# Patient Record
Sex: FEMALE | Race: WHITE | NOT HISPANIC OR LATINO | Employment: FULL TIME | URBAN - NONMETROPOLITAN AREA
[De-identification: names, ages, dates, MRNs, and addresses within clinical notes are randomized per-mention and may not be internally consistent; named-entity substitution may affect disease eponyms.]

---

## 2017-05-31 ENCOUNTER — TRANSCRIBE ORDERS (OUTPATIENT)
Dept: ADMINISTRATIVE | Facility: HOSPITAL | Age: 32
End: 2017-05-31

## 2017-05-31 DIAGNOSIS — N63.0 BREAST LUMP: Primary | ICD-10-CM

## 2017-06-05 ENCOUNTER — HOSPITAL ENCOUNTER (OUTPATIENT)
Dept: ULTRASOUND IMAGING | Facility: HOSPITAL | Age: 32
Discharge: HOME | End: 2017-06-05
Attending: OBSTETRICS & GYNECOLOGY | Admitting: OBSTETRICS & GYNECOLOGY

## 2017-06-05 ENCOUNTER — HOSPITAL ENCOUNTER (OUTPATIENT)
Dept: MAMMOGRAPHY | Facility: HOSPITAL | Age: 32
Discharge: HOME | End: 2017-06-05

## 2017-06-05 DIAGNOSIS — N63.0 BREAST LUMP: ICD-10-CM

## 2017-06-05 PROCEDURE — 76642 ULTRASOUND BREAST LIMITED: CPT | Performed by: RADIOLOGY

## 2017-06-05 PROCEDURE — 76642 ULTRASOUND BREAST LIMITED: CPT

## 2017-06-05 PROCEDURE — G0279 TOMOSYNTHESIS, MAMMO: HCPCS

## 2017-06-05 PROCEDURE — 77065 DX MAMMO INCL CAD UNI: CPT | Performed by: RADIOLOGY

## 2017-06-05 PROCEDURE — 77061 BREAST TOMOSYNTHESIS UNI: CPT | Performed by: RADIOLOGY

## 2017-06-05 PROCEDURE — G0206 DX MAMMO INCL CAD UNI: HCPCS

## 2017-10-11 ENCOUNTER — HOSPITAL ENCOUNTER (OUTPATIENT)
Dept: GENERAL RADIOLOGY | Facility: HOSPITAL | Age: 32
Discharge: HOME | End: 2017-10-11

## 2017-10-11 ENCOUNTER — HOSPITAL ENCOUNTER (OUTPATIENT)
Dept: GENERAL RADIOLOGY | Facility: HOSPITAL | Age: 32
Discharge: HOME | End: 2017-10-11
Admitting: NURSE PRACTITIONER

## 2017-10-11 ENCOUNTER — TRANSCRIBE ORDERS (OUTPATIENT)
Dept: ADMINISTRATIVE | Facility: HOSPITAL | Age: 32
End: 2017-10-11

## 2017-10-11 ENCOUNTER — LAB (OUTPATIENT)
Dept: LAB | Facility: HOSPITAL | Age: 32
End: 2017-10-11

## 2017-10-11 DIAGNOSIS — M25.50 ARTHRALGIA, UNSPECIFIED JOINT: ICD-10-CM

## 2017-10-11 DIAGNOSIS — Z79.899 ENCOUNTER FOR LONG-TERM (CURRENT) USE OF HIGH-RISK MEDICATION: ICD-10-CM

## 2017-10-11 DIAGNOSIS — D64.9 ANEMIA, UNSPECIFIED TYPE: ICD-10-CM

## 2017-10-11 DIAGNOSIS — M25.50 ARTHRALGIA, UNSPECIFIED JOINT: Primary | ICD-10-CM

## 2017-10-11 LAB
ALBUMIN SERPL-MCNC: 4.4 G/DL (ref 3.5–5)
ALBUMIN/GLOB SERPL: 1.6 G/DL (ref 1.5–2.5)
ALP SERPL-CCNC: 77 U/L (ref 35–104)
ALT SERPL W P-5'-P-CCNC: 42 U/L (ref 10–36)
ANION GAP SERPL CALCULATED.3IONS-SCNC: 6.2 MMOL/L (ref 3.6–11.2)
AST SERPL-CCNC: 21 U/L (ref 10–30)
BASOPHILS # BLD AUTO: 0.02 10*3/MM3 (ref 0–0.3)
BASOPHILS NFR BLD AUTO: 0.2 % (ref 0–2)
BILIRUB SERPL-MCNC: 0.3 MG/DL (ref 0.2–1.8)
BUN BLD-MCNC: 6 MG/DL (ref 7–21)
BUN/CREAT SERPL: 8.7 (ref 7–25)
CALCIUM SPEC-SCNC: 9.6 MG/DL (ref 7.7–10)
CHLORIDE SERPL-SCNC: 107 MMOL/L (ref 99–112)
CHROMATIN AB SERPL-ACNC: 6 IU/ML (ref 0–14)
CO2 SERPL-SCNC: 23.8 MMOL/L (ref 24.3–31.9)
CREAT BLD-MCNC: 0.69 MG/DL (ref 0.43–1.29)
CRP SERPL-MCNC: 0.78 MG/DL (ref 0–0.99)
DEPRECATED RDW RBC AUTO: 43 FL (ref 37–54)
EOSINOPHIL # BLD AUTO: 0.14 10*3/MM3 (ref 0–0.7)
EOSINOPHIL NFR BLD AUTO: 1.6 % (ref 0–5)
ERYTHROCYTE [DISTWIDTH] IN BLOOD BY AUTOMATED COUNT: 14 % (ref 11.5–14.5)
ERYTHROCYTE [SEDIMENTATION RATE] IN BLOOD: 10 MM/HR (ref 0–20)
FSH SERPL-ACNC: 6.1 MIU/ML
GFR SERPL CREATININE-BSD FRML MDRD: 98 ML/MIN/1.73
GLOBULIN UR ELPH-MCNC: 2.8 GM/DL
GLUCOSE BLD-MCNC: 123 MG/DL (ref 70–110)
HBA1C MFR BLD: 5.9 % (ref 4.5–5.7)
HCT VFR BLD AUTO: 43.7 % (ref 37–47)
HGB BLD-MCNC: 14.3 G/DL (ref 12–16)
IMM GRANULOCYTES # BLD: 0.03 10*3/MM3 (ref 0–0.03)
IMM GRANULOCYTES NFR BLD: 0.4 % (ref 0–0.5)
IRON 24H UR-MRATE: 102 MCG/DL (ref 49–151)
IRON SATN MFR SERPL: 29 % (ref 15–50)
LH SERPL-ACNC: 7.2 MIU/ML
LYMPHOCYTES # BLD AUTO: 2.18 10*3/MM3 (ref 1–3)
LYMPHOCYTES NFR BLD AUTO: 25.7 % (ref 21–51)
MCH RBC QN AUTO: 27.9 PG (ref 27–33)
MCHC RBC AUTO-ENTMCNC: 32.7 G/DL (ref 33–37)
MCV RBC AUTO: 85.2 FL (ref 80–94)
MONOCYTES # BLD AUTO: 0.64 10*3/MM3 (ref 0.1–0.9)
MONOCYTES NFR BLD AUTO: 7.5 % (ref 0–10)
NEUTROPHILS # BLD AUTO: 5.48 10*3/MM3 (ref 1.4–6.5)
NEUTROPHILS NFR BLD AUTO: 64.6 % (ref 30–70)
OSMOLALITY SERPL CALC.SUM OF ELEC: 272.8 MOSM/KG (ref 273–305)
PLATELET # BLD AUTO: 215 10*3/MM3 (ref 130–400)
PMV BLD AUTO: 9.6 FL (ref 6–10)
POTASSIUM BLD-SCNC: 3.9 MMOL/L (ref 3.5–5.3)
PROT SERPL-MCNC: 7.2 G/DL (ref 6–8)
RBC # BLD AUTO: 5.13 10*6/MM3 (ref 4.2–5.4)
SODIUM BLD-SCNC: 137 MMOL/L (ref 135–153)
T4 FREE SERPL-MCNC: 1.04 NG/DL (ref 0.89–1.76)
TESTOST SERPL-MCNC: 37.85 NG/DL
TIBC SERPL-MCNC: 346 MCG/DL (ref 241–421)
TSH SERPL DL<=0.05 MIU/L-ACNC: 2.19 MIU/ML (ref 0.55–4.78)
URATE SERPL-MCNC: 5.5 MG/DL (ref 2.4–5.7)
WBC NRBC COR # BLD: 8.49 10*3/MM3 (ref 4.5–12.5)

## 2017-10-11 PROCEDURE — 36415 COLL VENOUS BLD VENIPUNCTURE: CPT

## 2017-10-11 PROCEDURE — 72074 X-RAY EXAM THORAC SPINE4/>VW: CPT | Performed by: RADIOLOGY

## 2017-10-11 PROCEDURE — 86038 ANTINUCLEAR ANTIBODIES: CPT | Performed by: NURSE PRACTITIONER

## 2017-10-11 PROCEDURE — 83036 HEMOGLOBIN GLYCOSYLATED A1C: CPT | Performed by: NURSE PRACTITIONER

## 2017-10-11 PROCEDURE — 84439 ASSAY OF FREE THYROXINE: CPT | Performed by: NURSE PRACTITIONER

## 2017-10-11 PROCEDURE — 84550 ASSAY OF BLOOD/URIC ACID: CPT | Performed by: NURSE PRACTITIONER

## 2017-10-11 PROCEDURE — 84443 ASSAY THYROID STIM HORMONE: CPT | Performed by: NURSE PRACTITIONER

## 2017-10-11 PROCEDURE — 72074 X-RAY EXAM THORAC SPINE4/>VW: CPT

## 2017-10-11 PROCEDURE — 83540 ASSAY OF IRON: CPT | Performed by: NURSE PRACTITIONER

## 2017-10-11 PROCEDURE — 85025 COMPLETE CBC W/AUTO DIFF WBC: CPT | Performed by: NURSE PRACTITIONER

## 2017-10-11 PROCEDURE — 83550 IRON BINDING TEST: CPT | Performed by: NURSE PRACTITIONER

## 2017-10-11 PROCEDURE — 72050 X-RAY EXAM NECK SPINE 4/5VWS: CPT

## 2017-10-11 PROCEDURE — 83002 ASSAY OF GONADOTROPIN (LH): CPT | Performed by: NURSE PRACTITIONER

## 2017-10-11 PROCEDURE — 72050 X-RAY EXAM NECK SPINE 4/5VWS: CPT | Performed by: RADIOLOGY

## 2017-10-11 PROCEDURE — 80053 COMPREHEN METABOLIC PANEL: CPT | Performed by: NURSE PRACTITIONER

## 2017-10-11 PROCEDURE — 83001 ASSAY OF GONADOTROPIN (FSH): CPT | Performed by: NURSE PRACTITIONER

## 2017-10-11 PROCEDURE — 86431 RHEUMATOID FACTOR QUANT: CPT | Performed by: NURSE PRACTITIONER

## 2017-10-11 PROCEDURE — 72110 X-RAY EXAM L-2 SPINE 4/>VWS: CPT

## 2017-10-11 PROCEDURE — 82306 VITAMIN D 25 HYDROXY: CPT | Performed by: NURSE PRACTITIONER

## 2017-10-11 PROCEDURE — 84403 ASSAY OF TOTAL TESTOSTERONE: CPT | Performed by: NURSE PRACTITIONER

## 2017-10-11 PROCEDURE — 85652 RBC SED RATE AUTOMATED: CPT | Performed by: NURSE PRACTITIONER

## 2017-10-11 PROCEDURE — 72110 X-RAY EXAM L-2 SPINE 4/>VWS: CPT | Performed by: RADIOLOGY

## 2017-10-11 PROCEDURE — 86140 C-REACTIVE PROTEIN: CPT | Performed by: NURSE PRACTITIONER

## 2017-10-12 LAB — ANA SER QL: NEGATIVE

## 2017-10-14 LAB
25(OH)D2 SERPL-MCNC: <1 NG/ML
25(OH)D3 SERPL-MCNC: 15 NG/ML
25(OH)D3 SERPL-MCNC: 15 NG/ML

## 2017-11-01 ENCOUNTER — OFFICE VISIT (OUTPATIENT)
Dept: NEUROSURGERY | Facility: CLINIC | Age: 32
End: 2017-11-01

## 2017-11-01 VITALS — HEART RATE: 98 BPM | HEIGHT: 63 IN | TEMPERATURE: 96.8 F | BODY MASS INDEX: 40.04 KG/M2 | WEIGHT: 226 LBS

## 2017-11-01 DIAGNOSIS — M50.30 DEGENERATION OF CERVICAL INTERVERTEBRAL DISC: Primary | ICD-10-CM

## 2017-11-01 PROCEDURE — 99203 OFFICE O/P NEW LOW 30 MIN: CPT | Performed by: PHYSICIAN ASSISTANT

## 2017-11-01 RX ORDER — POTASSIUM CHLORIDE 1.5 G/1.77G
20 POWDER, FOR SOLUTION ORAL 2 TIMES DAILY
COMMUNITY
End: 2018-01-12

## 2017-11-01 RX ORDER — IBUPROFEN 200 MG
800 TABLET ORAL EVERY 6 HOURS PRN
COMMUNITY
End: 2017-12-05

## 2017-11-01 RX ORDER — DICLOFENAC SODIUM 75 MG/1
75 TABLET, DELAYED RELEASE ORAL 2 TIMES DAILY
Qty: 60 TABLET | Refills: 1 | Status: SHIPPED | OUTPATIENT
Start: 2017-11-01 | End: 2018-01-18 | Stop reason: HOSPADM

## 2017-11-01 NOTE — PROGRESS NOTES
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  Today she presents with complaints of pain extending to the right shoulder and down the back of the arm into the ulnar fingers with associated numbness.  This is predominantly on the right side but she is beginning to experience similar symptoms on the left.  This has slowly progressed over the course of a few months.  It is bothersome with any kind of movement.  She has been taking ibuprofen which dulls the pain. She has tried physical therapy but it makes symptoms worse.  Lifting, bending, and reaching exacerbate her symptoms. Nothing in particular helps.     The following portions of the patient's history were reviewed and updated as appropriate: allergies, current medications, past family history, past medical history, past social history, past surgical history and problem list.    Review of Systems   Constitutional: Negative for activity change, appetite change, chills, diaphoresis, fatigue, fever and unexpected weight change.   HENT: Negative for congestion, dental problem, drooling, ear discharge, ear pain, facial swelling, hearing loss, mouth sores, nosebleeds, postnasal drip, rhinorrhea, sinus pressure, sneezing, sore throat, tinnitus, trouble swallowing and voice change.    Eyes: Negative for photophobia, pain, discharge, redness, itching and visual disturbance.   Respiratory: Negative for apnea, cough, choking, chest tightness, shortness of breath, wheezing and stridor.    Cardiovascular: Negative for chest pain, palpitations and leg swelling.   Gastrointestinal: Negative for abdominal distention, abdominal pain, anal bleeding, blood in stool, constipation, diarrhea, nausea, rectal pain and  "vomiting.   Endocrine: Negative for cold intolerance, heat intolerance, polydipsia, polyphagia and polyuria.   Genitourinary: Negative for decreased urine volume, difficulty urinating, dysuria, enuresis, flank pain, frequency, genital sores, hematuria and urgency.   Musculoskeletal: Negative for arthralgias, back pain, gait problem, joint swelling, myalgias, neck pain and neck stiffness.   Skin: Negative for color change, pallor, rash and wound.   Allergic/Immunologic: Negative for environmental allergies, food allergies and immunocompromised state.   Neurological: Negative for dizziness, tremors, seizures, syncope, facial asymmetry, speech difficulty, weakness, light-headedness, numbness and headaches.   Hematological: Negative for adenopathy. Does not bruise/bleed easily.   Psychiatric/Behavioral: Negative for agitation, behavioral problems, confusion, decreased concentration, dysphoric mood, hallucinations, self-injury, sleep disturbance and suicidal ideas. The patient is not nervous/anxious and is not hyperactive.        Objective   Vital Signs: Pulse 98, temperature 96.8 °F (36 °C), height 63\" (160 cm), weight 226 lb (103 kg).  Physical Exam   Constitutional: She appears well-developed and well-nourished. No distress.   HENT:   Head: Normocephalic and atraumatic.   Eyes: EOM are normal. Pupils are equal, round, and reactive to light.   Cardiovascular: Normal rate, regular rhythm and normal heart sounds.    Pulmonary/Chest: Effort normal and breath sounds normal.   Psychiatric: She has a normal mood and affect. Her behavior is normal. Thought content normal.   Nursing note and vitals reviewed.  Musculoskeletal:  Strength is intact in upper and lower extremities to direct testing.  Muscle tone is normal throughout.  Station and gait are normal.  Neurologic:  Gait: Able to tandem walk without difficulty.  Coordination is intact.  Finger to nose, heel-to-shin, rapid alternating movements.  Deep tendon reflexes: 2+ " and symmetrical.  Sensation is intact to light touch throughout.  Patient is oriented to person, place, and time.  Brody sign negative. No ankle clonus    Assessment/Plan    Diagnosis: Degenerative disc disease with possible cervical radiculopathy   Medical Decision Making: I referred patient for an MRI of the cervical spine without gadolinium. She will follow up in our office and further recommendations will be made at that time.            Charo Hou PA-C  Patient Care Team:  IDALMIS Vazquez as PCP - General (Family Medicine)

## 2017-11-13 ENCOUNTER — HOSPITAL ENCOUNTER (OUTPATIENT)
Dept: MRI IMAGING | Facility: HOSPITAL | Age: 32
Discharge: HOME | End: 2017-11-13
Admitting: PHYSICIAN ASSISTANT

## 2017-11-13 DIAGNOSIS — M50.30 DEGENERATION OF CERVICAL INTERVERTEBRAL DISC: ICD-10-CM

## 2017-11-13 PROCEDURE — 72141 MRI NECK SPINE W/O DYE: CPT | Performed by: RADIOLOGY

## 2017-11-13 PROCEDURE — 72141 MRI NECK SPINE W/O DYE: CPT

## 2017-11-20 ENCOUNTER — OFFICE VISIT (OUTPATIENT)
Dept: NEUROSURGERY | Facility: CLINIC | Age: 32
End: 2017-11-20

## 2017-11-20 VITALS
DIASTOLIC BLOOD PRESSURE: 84 MMHG | BODY MASS INDEX: 40.11 KG/M2 | TEMPERATURE: 98.5 F | HEIGHT: 63 IN | WEIGHT: 226.4 LBS | SYSTOLIC BLOOD PRESSURE: 118 MMHG

## 2017-11-20 DIAGNOSIS — M54.12 CERVICAL RADICULOPATHY: ICD-10-CM

## 2017-11-20 DIAGNOSIS — M50.30 DEGENERATION OF CERVICAL INTERVERTEBRAL DISC: Primary | ICD-10-CM

## 2017-11-20 PROCEDURE — 99213 OFFICE O/P EST LOW 20 MIN: CPT | Performed by: PHYSICIAN ASSISTANT

## 2017-11-20 NOTE — PROGRESS NOTES
Patient: Marcie Russ  : 1985  Chart #: [id number]    Date of Service: 2017    CHIEF COMPLAINT: Neck and left shoulder and arm pain with sensory alteration    History of Present Illness Ms. Russ is seen in follow-up.  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.  Patient did well for a while and then started having pain into the right arm again and occasionally some tingling into the thumb and index finger.  Overall she is managing those symptoms with physical therapy.  Her main complaint  increased neck pain that radiates to the left shoulder.  Occasionally this goes down the back of her arm.  She has noticed some numbness in the ulnar fingers recently. Her pain has been gradually getting worse over the past 6 months.  This is particularly bothersome at work with lifting, bending, and reaching.  She has not found anything that alleviates her pain.    The following portions of the patient's history were reviewed and updated as appropriate: allergies, current medications, past family history, past medical history, past social history, past surgical history and problem list.    Review of Systems   Constitutional: Positive for activity change and fatigue. Negative for appetite change, chills, diaphoresis, fever and unexpected weight change.   HENT: Positive for ear pain and tinnitus. Negative for congestion, dental problem, drooling, ear discharge, facial swelling, hearing loss, mouth sores, nosebleeds, postnasal drip, rhinorrhea, sinus pressure, sneezing, sore throat, trouble swallowing and voice change.    Eyes: Negative for photophobia, pain, discharge, redness, itching and visual disturbance.   Respiratory: Negative for apnea, cough, choking, chest tightness, shortness of breath, wheezing and stridor.    Cardiovascular: Negative for chest pain, palpitations and leg swelling.   Gastrointestinal: Positive for  "constipation. Negative for abdominal distention, abdominal pain, anal bleeding, blood in stool, diarrhea, nausea, rectal pain and vomiting.   Endocrine: Negative for cold intolerance, heat intolerance, polydipsia, polyphagia and polyuria.   Genitourinary: Negative for decreased urine volume, difficulty urinating, dysuria, enuresis, flank pain, frequency, genital sores, hematuria and urgency.   Musculoskeletal: Positive for arthralgias, back pain, myalgias, neck pain and neck stiffness. Negative for gait problem and joint swelling.   Skin: Negative for color change, pallor, rash and wound.   Allergic/Immunologic: Negative for environmental allergies, food allergies and immunocompromised state.   Neurological: Positive for numbness (last 3 digits on left hand) and headaches. Negative for dizziness, tremors, seizures, syncope, facial asymmetry, speech difficulty, weakness and light-headedness.   Hematological: Negative for adenopathy. Does not bruise/bleed easily.   Psychiatric/Behavioral: Negative for agitation, behavioral problems, confusion, decreased concentration, dysphoric mood, hallucinations, self-injury, sleep disturbance and suicidal ideas. The patient is not nervous/anxious and is not hyperactive.    All other systems reviewed and are negative.      Objective   Vital Signs: Blood pressure 118/84, temperature 98.5 °F (36.9 °C), temperature source Temporal Artery , height 63\" (160 cm), weight 226 lb 6.4 oz (103 kg).  Physical Exam  Constitutional: She appears well-developed and well-nourished. No distress.   HENT:   Head: Normocephalic and atraumatic.   Eyes: EOM are normal. Pupils are equal, round, and reactive to light.   Cardiovascular: Normal rate, regular rhythm and normal heart sounds.    Pulmonary/Chest: Effort normal and breath sounds normal.   Psychiatric: She has a normal mood and affect. Her behavior is normal. Thought content normal.   Nursing note and vitals reviewed.  Musculoskeletal:  Strength " is intact in upper and lower extremities to direct testing.  Muscle tone is normal throughout.  Station and gait are normal.  Neurologic:  Gait: Able to tandem walk without difficulty.  Coordination is intact.  Finger to nose, heel-to-shin, rapid alternating movements.  Deep tendon reflexes: 2+ and symmetrical.  Sensation is intact to light touch throughout.  Patient is oriented to person, place, and time.  Brody sign negative. No ankle clonus       Radiographic Imaging: MRI of the cervical spine demonstrates some disc bulge at C4-5, central with a leftward component which may be the source of some of her shoulder complaints. Post-surgical fusion at C5-6 Findings below her fusion are unremarkable.      Assessment/Plan    Diagnosis:    1. Disc bulge C4-5 which might the the source of some of her complaints.   2. History of ACDF C5-6 in 2012    Medical Decision Making: I referred patient for EMG/NCV studies to see if she may have some ulnar neuropathy that could explain some of the symptoms into her hand.  She will follow up with Dr. Wells with these studies and bring her MRI for his review.                Charo Hou PA-C  Patient Care Team:  IDALMIS Vazquez as PCP - General (Family Medicine)

## 2017-12-05 ENCOUNTER — OFFICE VISIT (OUTPATIENT)
Dept: NEUROSURGERY | Facility: CLINIC | Age: 32
End: 2017-12-05

## 2017-12-05 VITALS
BODY MASS INDEX: 40.54 KG/M2 | TEMPERATURE: 98.4 F | WEIGHT: 228.8 LBS | HEIGHT: 63 IN | SYSTOLIC BLOOD PRESSURE: 130 MMHG | DIASTOLIC BLOOD PRESSURE: 80 MMHG

## 2017-12-05 DIAGNOSIS — M50.30 DEGENERATION OF CERVICAL INTERVERTEBRAL DISC: Primary | ICD-10-CM

## 2017-12-05 DIAGNOSIS — M54.12 CERVICAL RADICULOPATHY: ICD-10-CM

## 2017-12-05 PROCEDURE — 99213 OFFICE O/P EST LOW 20 MIN: CPT | Performed by: PHYSICIAN ASSISTANT

## 2017-12-05 NOTE — PROGRESS NOTES
Patient: Marcie Russ  : 1985  Chart #: [id number]    Date of Service: 2017    CHIEF COMPLAINT: Neck and right arm pain     History of Present Illness Ms. Russ is seen in follow up.  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  Patient did well for a while and then started having pain into the right arm again and occasionally some tingling into the thumb, index, and middle finger. She also complains of pain in the neck that radiates to the left shoulder. She has numbness in the ulnar fingers on the left.  Her symptoms seem to vacillate.  The last time I saw her, the left arm was more bothersome, this time it is the right side. She has tried physical therapy with traction. Her pain has been progressing over the course of 6 months to a year. It is particularly bothersome with lifting, bending, and reaching.  She has not found anything that alleviates her pain.    The following portions of the patient's history were reviewed and updated as appropriate: allergies, current medications, past family history, past medical history, past social history, past surgical history and problem list.    Review of Systems   Constitutional: Positive for activity change. Negative for appetite change, chills, diaphoresis, fatigue, fever and unexpected weight change.   HENT: Positive for ear pain and tinnitus. Negative for congestion, dental problem, drooling, ear discharge, facial swelling, hearing loss, mouth sores, nosebleeds, postnasal drip, rhinorrhea, sinus pressure, sneezing, sore throat, trouble swallowing and voice change.    Eyes: Negative for photophobia, pain, discharge, redness, itching and visual disturbance.   Respiratory: Negative for apnea, cough, choking, chest tightness, shortness of breath, wheezing and stridor.    Cardiovascular: Negative for chest pain, palpitations and leg swelling.   Gastrointestinal: Negative for  "abdominal distention, abdominal pain, anal bleeding, blood in stool, constipation, diarrhea, nausea, rectal pain and vomiting.   Endocrine: Negative for cold intolerance, heat intolerance, polydipsia, polyphagia and polyuria.   Genitourinary: Positive for urgency. Negative for decreased urine volume, difficulty urinating, dysuria, enuresis, flank pain, frequency, genital sores and hematuria.   Musculoskeletal: Positive for arthralgias, back pain, myalgias, neck pain and neck stiffness. Negative for gait problem and joint swelling.   Skin: Negative for color change, pallor, rash and wound.   Allergic/Immunologic: Negative for environmental allergies, food allergies and immunocompromised state.   Neurological: Negative for dizziness, tremors, seizures, syncope, facial asymmetry, speech difficulty, weakness, light-headedness, numbness and headaches.   Hematological: Negative for adenopathy. Does not bruise/bleed easily.   Psychiatric/Behavioral: Negative for agitation, behavioral problems, confusion, decreased concentration, dysphoric mood, hallucinations, self-injury, sleep disturbance and suicidal ideas. The patient is not nervous/anxious and is not hyperactive.        Objective   Vital Signs: Blood pressure 130/80, temperature 98.4 °F (36.9 °C), height 160 cm (62.99\"), weight 104 kg (228 lb 12.8 oz).  Physical Exam  Constitutional: She appears well-developed and well-nourished. No distress.   HENT:   Head: Normocephalic and atraumatic.   Eyes: EOM are normal. Pupils are equal, round, and reactive to light.   Cardiovascular: Normal rate, regular rhythm and normal heart sounds.    Pulmonary/Chest: Effort normal and breath sounds normal.   Psychiatric: She has a normal mood and affect. Her behavior is normal. Thought content normal.   Nursing note and vitals reviewed.  Musculoskeletal:  Strength is intact in upper and lower extremities to direct testing.  Muscle tone is normal throughout.  Station and gait are " normal.  Neurologic:  Gait: Able to tandem walk without difficulty.  Coordination is intact.  Finger to nose, heel-to-shin, rapid alternating movements.  Deep tendon reflexes: 2+ and symmetrical.  Sensation is intact to light touch throughout.  Patient is oriented to person, place, and time.  Brody sign negative. No ankle clonus       Assessment/Plan    Medical Decision Making: Electrodiagnostic studies demonstrate mild right C7 radiculopathy.  Normal study on the left arm. Dr. Wells has once again reviewed patient's MRI and is under whelmed with the findings at C6-7. He has recommended patient have cervical myelogram to definitively determine the degree of foraminal narrowing.                  Charo Hou PA-C  Patient Care Team:  IDALMIS Vazquez as PCP - General (Family Medicine)

## 2017-12-12 ENCOUNTER — OFFICE VISIT (OUTPATIENT)
Dept: NEUROSURGERY | Facility: CLINIC | Age: 32
End: 2017-12-12

## 2017-12-12 ENCOUNTER — HOSPITAL ENCOUNTER (OUTPATIENT)
Dept: CT IMAGING | Facility: HOSPITAL | Age: 32
Discharge: HOME | End: 2017-12-12

## 2017-12-12 ENCOUNTER — HOSPITAL ENCOUNTER (OUTPATIENT)
Dept: GENERAL RADIOLOGY | Facility: HOSPITAL | Age: 32
Discharge: HOME | End: 2017-12-12
Admitting: NEUROLOGICAL SURGERY

## 2017-12-12 VITALS
RESPIRATION RATE: 18 BRPM | WEIGHT: 221.6 LBS | OXYGEN SATURATION: 97 % | TEMPERATURE: 97.8 F | SYSTOLIC BLOOD PRESSURE: 128 MMHG | BODY MASS INDEX: 37.83 KG/M2 | HEART RATE: 83 BPM | HEIGHT: 64 IN | DIASTOLIC BLOOD PRESSURE: 83 MMHG

## 2017-12-12 VITALS — TEMPERATURE: 98.3 F | WEIGHT: 228 LBS | BODY MASS INDEX: 38.93 KG/M2 | HEIGHT: 64 IN

## 2017-12-12 DIAGNOSIS — M50.30 DEGENERATION OF CERVICAL INTERVERTEBRAL DISC: ICD-10-CM

## 2017-12-12 DIAGNOSIS — Z98.1 HISTORY OF FUSION OF CERVICAL SPINE: ICD-10-CM

## 2017-12-12 DIAGNOSIS — M54.12 CERVICAL RADICULOPATHY: ICD-10-CM

## 2017-12-12 DIAGNOSIS — M50.30 BULGING OF CERVICAL INTERVERTEBRAL DISC: Primary | ICD-10-CM

## 2017-12-12 DIAGNOSIS — M50.30 DEGENERATIVE DISC DISEASE, CERVICAL: ICD-10-CM

## 2017-12-12 PROCEDURE — 72240 MYELOGRAPHY NECK SPINE: CPT

## 2017-12-12 PROCEDURE — 72125 CT NECK SPINE W/O DYE: CPT

## 2017-12-12 PROCEDURE — 99213 OFFICE O/P EST LOW 20 MIN: CPT | Performed by: NEUROLOGICAL SURGERY

## 2017-12-12 PROCEDURE — 0 IOPAMIDOL 61 % SOLUTION: Performed by: NEUROLOGICAL SURGERY

## 2017-12-12 RX ORDER — ACETAMINOPHEN 500 MG
500 TABLET ORAL EVERY 4 HOURS PRN
Status: CANCELLED | OUTPATIENT
Start: 2017-12-12

## 2017-12-12 RX ORDER — PROMETHAZINE HYDROCHLORIDE 25 MG/ML
25 INJECTION, SOLUTION INTRAMUSCULAR; INTRAVENOUS ONCE AS NEEDED
Status: CANCELLED | OUTPATIENT
Start: 2017-12-12

## 2017-12-12 RX ORDER — LIDOCAINE HYDROCHLORIDE 10 MG/ML
5 INJECTION, SOLUTION INFILTRATION; PERINEURAL ONCE
Status: COMPLETED | OUTPATIENT
Start: 2017-12-12 | End: 2017-12-12

## 2017-12-12 RX ORDER — ONDANSETRON 4 MG/1
4 TABLET, FILM COATED ORAL ONCE AS NEEDED
Status: CANCELLED | OUTPATIENT
Start: 2017-12-12

## 2017-12-12 RX ORDER — PROMETHAZINE HYDROCHLORIDE 25 MG/1
25 TABLET ORAL ONCE AS NEEDED
Status: CANCELLED | OUTPATIENT
Start: 2017-12-12

## 2017-12-12 RX ADMIN — LIDOCAINE HYDROCHLORIDE 5 ML: 10 INJECTION, SOLUTION INFILTRATION; PERINEURAL at 07:10

## 2017-12-12 RX ADMIN — IOPAMIDOL 15 ML: 612 INJECTION, SOLUTION INTRATHECAL at 07:10

## 2017-12-12 NOTE — PROGRESS NOTES
Patient: Marcie Russ  : 1985    Primary Care Provider: IDALMIS Vazquez    Requesting Provider: As above      History    Chief Complaint: Neck and right arm pain.    History of Present Illness: Ms. Russ is seen in follow up.  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  Patient did well for a while and then started having pain into the right arm again and occasionally some tingling into the thumb, index, and middle finger. She also complains of pain in the neck that radiates to the left shoulder. She has numbness in the ulnar fingers on the left.  Her symptoms seem to vacillate.  When seen initially the pain involved the left side but when seen last the pain had extended down the right arm.  It continues down the right arm with symptoms involving the thumb index and middle fingers.  Prior to her last surgery the pain involves the right arm as well.  Electrodiagnostic studies suggested a mild right C7 radiculopathy.    Review of Systems   Constitutional: Positive for activity change. Negative for appetite change, chills, diaphoresis, fatigue, fever and unexpected weight change.   HENT: Negative for congestion, dental problem, drooling, ear discharge, ear pain, facial swelling, hearing loss, mouth sores, nosebleeds, postnasal drip, rhinorrhea, sinus pressure, sneezing, sore throat, tinnitus, trouble swallowing and voice change.    Eyes: Negative for photophobia, pain, discharge, redness, itching and visual disturbance.   Respiratory: Negative for apnea, cough, choking, chest tightness, shortness of breath, wheezing and stridor.    Cardiovascular: Negative for chest pain, palpitations and leg swelling.   Gastrointestinal: Negative for abdominal distention, abdominal pain, anal bleeding, blood in stool, constipation, diarrhea, nausea, rectal pain and vomiting.   Endocrine: Negative for cold intolerance, heat  "intolerance, polydipsia, polyphagia and polyuria.   Genitourinary: Negative for decreased urine volume, difficulty urinating, dysuria, enuresis, flank pain, frequency, genital sores, hematuria and urgency.   Musculoskeletal: Positive for arthralgias, myalgias, neck pain and neck stiffness. Negative for back pain, gait problem and joint swelling.   Skin: Negative for color change, pallor, rash and wound.   Allergic/Immunologic: Negative for environmental allergies, food allergies and immunocompromised state.   Neurological: Positive for weakness, numbness and headaches. Negative for dizziness, tremors, seizures, syncope, facial asymmetry, speech difficulty and light-headedness.   Hematological: Negative for adenopathy. Does not bruise/bleed easily.   Psychiatric/Behavioral: Negative for agitation, behavioral problems, confusion, decreased concentration, dysphoric mood, hallucinations, self-injury, sleep disturbance and suicidal ideas. The patient is not nervous/anxious and is not hyperactive.        The patient's past medical history, past surgical history, family history, and social history have been reviewed at length in the electronic medical record.    Physical Exam:   Temp 98.3 °F (36.8 °C)  Ht 162.6 cm (64\")  Wt 103 kg (228 lb)  BMI 39.14 kg/m2  MUSCULOSKELETAL:  Neck tenderness to palpation is not observed.   ROM in neck is normal.  NEUROLOGICAL:  Strength is intact in the upper and lower extremities to direct testing.  Muscle tone is normal throughout.  Station and gait are normal.  Sensation is intact to light touch testing throughout.  Deep tendon reflexes are 1+ and symmetrical.  Ricky's Sign is negative bilaterally.         Medical Decision Making    Data Review:   CT myelogram from this morning shows some paracentral disc bulging rightward at C4-5.  There is some central modest bulging at C2-3.  I don't see any significant root or canal compromise at C7.    Diagnosis:   The patient has neck and " right arm pain without a clear radiographic correlate.    Treatment Options:   I don't have a surgical option for Ms. Russ currently.  If she is struggling then I would pursue an epidural or series of epidural injections.  In the past year or so physical therapy has not been helpful.  She's going to consider her options.  If she would like to pursue the epidurals then she will contact my office and I will make a referral.  Otherwise she will follow-up in our clinic on an as-needed basis.       Diagnosis Plan   1. Bulging of cervical intervertebral disc     2. Cervical radiculopathy     3. Degenerative disc disease, cervical     4. History of fusion of cervical spine         Scribed for King Wells MD by My Smith CMA on 12/12/2017 at 1:26 PM    I, Dr. Wells, personally performed the services described in the documentation, as scribed in my presence, and it is both accurate and complete.

## 2017-12-12 NOTE — POST-PROCEDURE NOTE
MYELOGRAM PROCEDURE NOTE  Neurosurgery    Patient: Marcie Russ  : 1985      PreOp Diagnosis: Cervical radiculopathy    PostOp Diagnosis: Same    Procedure: Cervical myelogram    Surgeon: Russell    Anesthesia: 1% lidocaine    Technique:   Spinal needle: 22 gauge   Contrast:  Isovue 300 (15ml)   Injection site: R L4    EBL: Trace    Specimens removed: None    Complication: None        King Wells MD  17  7:12 AM

## 2017-12-12 NOTE — NURSING NOTE
Pt returned post procedure, tolerated well, bandaid dressing to lumbar spine CDI, no bleeding, drainage, redness, and/or swelling noted. No acute distress noted, no complaints voiced. CB in reach Will continue to monitor.

## 2017-12-12 NOTE — PLAN OF CARE
Problem: Patient Care Overview (Adult)  Goal: Plan of Care Review  Outcome: Ongoing (interventions implemented as appropriate)  Goal: Adult Individualization and Mutuality  Outcome: Ongoing (interventions implemented as appropriate)  Goal: Discharge Needs Assessment  Outcome: Ongoing (interventions implemented as appropriate)    Problem: Myelogram (Adult)  Goal: Signs and Symptoms of Listed Potential Problems Will be Absent or Manageable (Myelogram)  Outcome: Ongoing (interventions implemented as appropriate)

## 2017-12-12 NOTE — NURSING NOTE
Pt discharged post procedure, tolerated procedure well, bandaid dressing to lumbar spine CDI, no bleeding, swelling, redness, and/or drainage. No acute distress noted. Post procedure instructions given on site care, activity restrictions, and s/sx to notify provider with, pt verbalized understanding. Pt instructed to go home and lie down the remainder of day and rest, and not perform and strenuous activity, pt verbalized understanding

## 2017-12-13 ENCOUNTER — TELEPHONE (OUTPATIENT)
Dept: INTERVENTIONAL RADIOLOGY/VASCULAR | Facility: HOSPITAL | Age: 32
End: 2017-12-13

## 2017-12-13 NOTE — TELEPHONE ENCOUNTER
@[id number])@    Other Comments:

## 2018-01-11 ENCOUNTER — APPOINTMENT (OUTPATIENT)
Dept: PREADMISSION TESTING | Facility: HOSPITAL | Age: 33
End: 2018-01-11

## 2018-01-12 ENCOUNTER — APPOINTMENT (OUTPATIENT)
Dept: PREADMISSION TESTING | Facility: HOSPITAL | Age: 33
End: 2018-01-12

## 2018-01-12 VITALS — WEIGHT: 229.5 LBS | BODY MASS INDEX: 40.66 KG/M2 | HEIGHT: 63 IN

## 2018-01-12 LAB
DEPRECATED RDW RBC AUTO: 41.2 FL (ref 37–54)
ERYTHROCYTE [DISTWIDTH] IN BLOOD BY AUTOMATED COUNT: 13.6 % (ref 11.3–14.5)
HCT VFR BLD AUTO: 42.1 % (ref 34.5–44)
HGB BLD-MCNC: 14.2 G/DL (ref 11.5–15.5)
MCH RBC QN AUTO: 28.7 PG (ref 27–31)
MCHC RBC AUTO-ENTMCNC: 33.7 G/DL (ref 32–36)
MCV RBC AUTO: 85.1 FL (ref 80–99)
PLATELET # BLD AUTO: 207 10*3/MM3 (ref 150–450)
PMV BLD AUTO: 9.9 FL (ref 6–12)
RBC # BLD AUTO: 4.95 10*6/MM3 (ref 3.89–5.14)
WBC NRBC COR # BLD: 10.09 10*3/MM3 (ref 3.5–10.8)

## 2018-01-12 PROCEDURE — 36415 COLL VENOUS BLD VENIPUNCTURE: CPT

## 2018-01-12 PROCEDURE — 85027 COMPLETE CBC AUTOMATED: CPT | Performed by: ANESTHESIOLOGY

## 2018-01-12 RX ORDER — ACETAMINOPHEN 500 MG
500 TABLET ORAL EVERY 6 HOURS PRN
COMMUNITY
End: 2018-08-26

## 2018-01-12 NOTE — DISCHARGE INSTRUCTIONS
The following information and instructions were given:    NPO after MN except sips of water with routine prescribed medication (except blood thinner, diabetes, or weight reducing medication) unless otherwise instructed by your physician.  Do not eat, drink, smoke or chew gum after MN the night before surgery. This also includes no mints.    DO NOT shave for two days before your procedure.  Do not wear makeup.      DO NOT wear fingernail polish (gel/regular) and/or acrylic/artificial nails on the day of surgery.   If a patient had recent manicure and would rather not remove polish or artificial nails, then the minimum requirement is that the polish/artificial nails must be removed from the middle finger on each hand.      If patient was having surgery on an upper extremity, then the patient was instructed that fingernail polish/artificial fingernails must be removed for surgery.  NO EXCEPTIONS.      If patient was having surgery on a lower extremity, then the patient was instructed that toenail polish on both extremities must be removed for surgery.  NO EXCEPTIONS.    Remove all jewelry (advised to go to jeweler if unable to remove).  Jewelry especially rings can no longer be taped for surgery.    Leave anything you consider valuable at home.    Leave your suitcase in the car until after your surgery.    Bring the following with you (if applicable)   -picture ID and insurance cards   -Co-pay/deductible required by insurance   -Medications in the original bottles (not a list) including all over-the-counter  medications if not brought to PAT   -Copy of advance directive, living will or power of  documents if not  brought to PAT   -CPAP or BIPAP mask and tubing (do not bring machine)   -Skin prep instructions sheet   -PAT Pass    Education booklet, brochure, handout or binder given to patient.    Pain Control After Surgery handout given to patient.    Respirex use (handout given to patient) and pneumonia  prevention.    Signs and Symptoms of infection.    DVT Prevention stressing the importance of ambulation.    Patient to apply Chlorhexadine wipes to surgical area (as instructed) the night before procedure and the AM of procedure.    When applicable for ERAS patients (colon, orthropedic), patients were instructed to drink 20 ounces of Gatorade or G2 for diabetics (or until full) the morning of surgery.  The Gatorade or G2 must be consumed at least 3 hours before surgery start time.  No RED Gatorade or G2.  Appropriated ERAS handout given to patient during PAT visit.  cervical book given

## 2018-01-16 ENCOUNTER — ANESTHESIA EVENT (OUTPATIENT)
Dept: PERIOP | Facility: HOSPITAL | Age: 33
End: 2018-01-16

## 2018-01-17 ENCOUNTER — ANESTHESIA (OUTPATIENT)
Dept: PERIOP | Facility: HOSPITAL | Age: 33
End: 2018-01-17

## 2018-01-17 ENCOUNTER — APPOINTMENT (OUTPATIENT)
Dept: GENERAL RADIOLOGY | Facility: HOSPITAL | Age: 33
End: 2018-01-17

## 2018-01-17 ENCOUNTER — HOSPITAL ENCOUNTER (OUTPATIENT)
Facility: HOSPITAL | Age: 33
LOS: 1 days | Discharge: HOME | End: 2018-01-18
Attending: ORTHOPAEDIC SURGERY | Admitting: ORTHOPAEDIC SURGERY

## 2018-01-17 DIAGNOSIS — Z74.09 IMPAIRED FUNCTIONAL MOBILITY, BALANCE, GAIT, AND ENDURANCE: Primary | ICD-10-CM

## 2018-01-17 PROBLEM — Z98.1 S/P CERVICAL SPINAL FUSION: Status: ACTIVE | Noted: 2018-01-17

## 2018-01-17 PROBLEM — M54.2 NECK PAIN: Status: ACTIVE | Noted: 2018-01-17

## 2018-01-17 PROBLEM — R73.03 PREDIABETES: Status: ACTIVE | Noted: 2018-01-17

## 2018-01-17 LAB
B-HCG UR QL: NEGATIVE
GLUCOSE BLDC GLUCOMTR-MCNC: 127 MG/DL (ref 70–130)
GLUCOSE BLDC GLUCOMTR-MCNC: 173 MG/DL (ref 70–130)
INTERNAL NEGATIVE CONTROL: NORMAL
INTERNAL POSITIVE CONTROL: REACTIVE
Lab: NORMAL

## 2018-01-17 PROCEDURE — 25810000003 SODIUM CHLORIDE 0.9 % WITH KCL 20 MEQ 20-0.9 MEQ/L-% SOLUTION: Performed by: ORTHOPAEDIC SURGERY

## 2018-01-17 PROCEDURE — 97161 PT EVAL LOW COMPLEX 20 MIN: CPT

## 2018-01-17 PROCEDURE — 25010000002 DEXAMETHASONE PER 1 MG: Performed by: NURSE ANESTHETIST, CERTIFIED REGISTERED

## 2018-01-17 PROCEDURE — C1713 ANCHOR/SCREW BN/BN,TIS/BN: HCPCS | Performed by: ORTHOPAEDIC SURGERY

## 2018-01-17 PROCEDURE — L0120 CERV FLEX N/ADJ FOAM PRE OTS: HCPCS | Performed by: ORTHOPAEDIC SURGERY

## 2018-01-17 PROCEDURE — 76001 HC FLUORO GREATER THAN 1 HOUR: CPT

## 2018-01-17 PROCEDURE — 25010000002 FENTANYL CITRATE (PF) 100 MCG/2ML SOLUTION: Performed by: NURSE ANESTHETIST, CERTIFIED REGISTERED

## 2018-01-17 PROCEDURE — 25010000002 ONDANSETRON PER 1 MG: Performed by: ORTHOPAEDIC SURGERY

## 2018-01-17 PROCEDURE — 25010000002 HYDROMORPHONE PER 4 MG: Performed by: NURSE ANESTHETIST, CERTIFIED REGISTERED

## 2018-01-17 PROCEDURE — 25010000003 CEFAZOLIN IN DEXTROSE 2-4 GM/100ML-% SOLUTION: Performed by: ORTHOPAEDIC SURGERY

## 2018-01-17 PROCEDURE — 63710000001 INSULIN LISPRO (HUMAN) PER 5 UNITS: Performed by: NURSE PRACTITIONER

## 2018-01-17 PROCEDURE — G0378 HOSPITAL OBSERVATION PER HR: HCPCS

## 2018-01-17 PROCEDURE — 25010000002 NEOSTIGMINE 10 MG/10ML SOLUTION: Performed by: NURSE ANESTHETIST, CERTIFIED REGISTERED

## 2018-01-17 PROCEDURE — 25010000002 ONDANSETRON PER 1 MG: Performed by: NURSE ANESTHETIST, CERTIFIED REGISTERED

## 2018-01-17 PROCEDURE — 76000 FLUOROSCOPY <1 HR PHYS/QHP: CPT

## 2018-01-17 PROCEDURE — 25010000002 PROMETHAZINE PER 50 MG: Performed by: NURSE ANESTHETIST, CERTIFIED REGISTERED

## 2018-01-17 PROCEDURE — 82962 GLUCOSE BLOOD TEST: CPT

## 2018-01-17 PROCEDURE — 63710000001 DEXAMETHASONE PER 0.25 MG: Performed by: ORTHOPAEDIC SURGERY

## 2018-01-17 PROCEDURE — 25010000002 PROPOFOL 10 MG/ML EMULSION: Performed by: NURSE ANESTHETIST, CERTIFIED REGISTERED

## 2018-01-17 DEVICE — SCRW SKYLINE VAR SD 15MM: Type: IMPLANTABLE DEVICE | Site: SPINE CERVICAL | Status: FUNCTIONAL

## 2018-01-17 DEVICE — BENGAL SYSTEM STANDARD IMPLANT 6MM, 7 DEGREES
Type: IMPLANTABLE DEVICE | Site: SPINE CERVICAL | Status: FUNCTIONAL
Brand: BENGAL

## 2018-01-17 DEVICE — PLT SKYLINE 1LEVEL 12MM: Type: IMPLANTABLE DEVICE | Site: SPINE CERVICAL | Status: FUNCTIONAL

## 2018-01-17 DEVICE — ALLOGRFT BONE VIVIGEN CELLUAR MATRX FZ 1CC: Type: IMPLANTABLE DEVICE | Site: SPINE CERVICAL | Status: FUNCTIONAL

## 2018-01-17 RX ORDER — LIDOCAINE HYDROCHLORIDE 10 MG/ML
INJECTION, SOLUTION EPIDURAL; INFILTRATION; INTRACAUDAL; PERINEURAL AS NEEDED
Status: DISCONTINUED | OUTPATIENT
Start: 2018-01-17 | End: 2018-01-17 | Stop reason: SURG

## 2018-01-17 RX ORDER — BISACODYL 5 MG/1
5 TABLET, DELAYED RELEASE ORAL DAILY PRN
Status: DISCONTINUED | OUTPATIENT
Start: 2018-01-17 | End: 2018-01-18 | Stop reason: HOSPADM

## 2018-01-17 RX ORDER — IPRATROPIUM BROMIDE AND ALBUTEROL SULFATE 2.5; .5 MG/3ML; MG/3ML
3 SOLUTION RESPIRATORY (INHALATION) ONCE AS NEEDED
Status: DISCONTINUED | OUTPATIENT
Start: 2018-01-17 | End: 2018-01-17 | Stop reason: HOSPADM

## 2018-01-17 RX ORDER — VECURONIUM BROMIDE 1 MG/ML
INJECTION, POWDER, LYOPHILIZED, FOR SOLUTION INTRAVENOUS AS NEEDED
Status: DISCONTINUED | OUTPATIENT
Start: 2018-01-17 | End: 2018-01-17 | Stop reason: SURG

## 2018-01-17 RX ORDER — FAMOTIDINE 20 MG/1
20 TABLET, FILM COATED ORAL ONCE
Status: COMPLETED | OUTPATIENT
Start: 2018-01-17 | End: 2018-01-17

## 2018-01-17 RX ORDER — HYDROMORPHONE HYDROCHLORIDE 1 MG/ML
0.5 INJECTION, SOLUTION INTRAMUSCULAR; INTRAVENOUS; SUBCUTANEOUS
Status: DISCONTINUED | OUTPATIENT
Start: 2018-01-17 | End: 2018-01-17 | Stop reason: HOSPADM

## 2018-01-17 RX ORDER — LIDOCAINE HYDROCHLORIDE 10 MG/ML
0.5 INJECTION, SOLUTION EPIDURAL; INFILTRATION; INTRACAUDAL; PERINEURAL ONCE AS NEEDED
Status: COMPLETED | OUTPATIENT
Start: 2018-01-17 | End: 2018-01-17

## 2018-01-17 RX ORDER — SODIUM CHLORIDE AND POTASSIUM CHLORIDE 150; 900 MG/100ML; MG/100ML
100 INJECTION, SOLUTION INTRAVENOUS CONTINUOUS
Status: DISCONTINUED | OUTPATIENT
Start: 2018-01-17 | End: 2018-01-18 | Stop reason: HOSPADM

## 2018-01-17 RX ORDER — ONDANSETRON 2 MG/ML
4 INJECTION INTRAMUSCULAR; INTRAVENOUS EVERY 6 HOURS PRN
Status: DISCONTINUED | OUTPATIENT
Start: 2018-01-17 | End: 2018-01-18 | Stop reason: HOSPADM

## 2018-01-17 RX ORDER — SODIUM CHLORIDE, SODIUM LACTATE, POTASSIUM CHLORIDE, CALCIUM CHLORIDE 600; 310; 30; 20 MG/100ML; MG/100ML; MG/100ML; MG/100ML
9 INJECTION, SOLUTION INTRAVENOUS CONTINUOUS
Status: DISCONTINUED | OUTPATIENT
Start: 2018-01-17 | End: 2018-01-18 | Stop reason: HOSPADM

## 2018-01-17 RX ORDER — OXYCODONE AND ACETAMINOPHEN 7.5; 325 MG/1; MG/1
1 TABLET ORAL EVERY 4 HOURS PRN
Status: DISCONTINUED | OUTPATIENT
Start: 2018-01-17 | End: 2018-01-18 | Stop reason: HOSPADM

## 2018-01-17 RX ORDER — SODIUM CHLORIDE 0.9 % (FLUSH) 0.9 %
1-10 SYRINGE (ML) INJECTION AS NEEDED
Status: DISCONTINUED | OUTPATIENT
Start: 2018-01-17 | End: 2018-01-17 | Stop reason: HOSPADM

## 2018-01-17 RX ORDER — ONDANSETRON 2 MG/ML
INJECTION INTRAMUSCULAR; INTRAVENOUS AS NEEDED
Status: DISCONTINUED | OUTPATIENT
Start: 2018-01-17 | End: 2018-01-17 | Stop reason: SURG

## 2018-01-17 RX ORDER — GLYCOPYRROLATE 0.2 MG/ML
INJECTION INTRAMUSCULAR; INTRAVENOUS AS NEEDED
Status: DISCONTINUED | OUTPATIENT
Start: 2018-01-17 | End: 2018-01-17 | Stop reason: SURG

## 2018-01-17 RX ORDER — PROPOFOL 10 MG/ML
VIAL (ML) INTRAVENOUS AS NEEDED
Status: DISCONTINUED | OUTPATIENT
Start: 2018-01-17 | End: 2018-01-17 | Stop reason: SURG

## 2018-01-17 RX ORDER — FAMOTIDINE 10 MG/ML
20 INJECTION, SOLUTION INTRAVENOUS EVERY 12 HOURS SCHEDULED
Status: DISCONTINUED | OUTPATIENT
Start: 2018-01-17 | End: 2018-01-18 | Stop reason: HOSPADM

## 2018-01-17 RX ORDER — SODIUM CHLORIDE 0.9 % (FLUSH) 0.9 %
1-10 SYRINGE (ML) INJECTION AS NEEDED
Status: DISCONTINUED | OUTPATIENT
Start: 2018-01-17 | End: 2018-01-18 | Stop reason: HOSPADM

## 2018-01-17 RX ORDER — IBUPROFEN 600 MG/1
600 TABLET ORAL ONCE AS NEEDED
Status: DISCONTINUED | OUTPATIENT
Start: 2018-01-17 | End: 2018-01-17 | Stop reason: HOSPADM

## 2018-01-17 RX ORDER — PROMETHAZINE HYDROCHLORIDE 25 MG/1
25 TABLET ORAL ONCE AS NEEDED
Status: COMPLETED | OUTPATIENT
Start: 2018-01-17 | End: 2018-01-17

## 2018-01-17 RX ORDER — ZOLPIDEM TARTRATE 5 MG/1
5 TABLET ORAL NIGHTLY PRN
Status: DISCONTINUED | OUTPATIENT
Start: 2018-01-17 | End: 2018-01-18 | Stop reason: HOSPADM

## 2018-01-17 RX ORDER — BUPIVACAINE HCL/0.9 % NACL/PF 0.1 %
2 PLASTIC BAG, INJECTION (ML) EPIDURAL ONCE
Status: COMPLETED | OUTPATIENT
Start: 2018-01-17 | End: 2018-01-17

## 2018-01-17 RX ORDER — NICOTINE POLACRILEX 4 MG
15 LOZENGE BUCCAL
Status: DISCONTINUED | OUTPATIENT
Start: 2018-01-17 | End: 2018-01-18 | Stop reason: HOSPADM

## 2018-01-17 RX ORDER — CEFAZOLIN SODIUM 2 G/100ML
2 INJECTION, SOLUTION INTRAVENOUS EVERY 8 HOURS
Status: COMPLETED | OUTPATIENT
Start: 2018-01-17 | End: 2018-01-18

## 2018-01-17 RX ORDER — HYDROMORPHONE HYDROCHLORIDE 1 MG/ML
2 INJECTION, SOLUTION INTRAMUSCULAR; INTRAVENOUS; SUBCUTANEOUS EVERY 4 HOURS PRN
Status: DISCONTINUED | OUTPATIENT
Start: 2018-01-17 | End: 2018-01-18 | Stop reason: HOSPADM

## 2018-01-17 RX ORDER — FENTANYL CITRATE 50 UG/ML
50 INJECTION, SOLUTION INTRAMUSCULAR; INTRAVENOUS
Status: DISCONTINUED | OUTPATIENT
Start: 2018-01-17 | End: 2018-01-17 | Stop reason: HOSPADM

## 2018-01-17 RX ORDER — FAMOTIDINE 20 MG/1
20 TABLET, FILM COATED ORAL EVERY 12 HOURS SCHEDULED
Status: DISCONTINUED | OUTPATIENT
Start: 2018-01-17 | End: 2018-01-18 | Stop reason: HOSPADM

## 2018-01-17 RX ORDER — HYDRALAZINE HYDROCHLORIDE 20 MG/ML
10 INJECTION INTRAMUSCULAR; INTRAVENOUS EVERY 6 HOURS PRN
Status: DISCONTINUED | OUTPATIENT
Start: 2018-01-17 | End: 2018-01-18 | Stop reason: HOSPADM

## 2018-01-17 RX ORDER — OXYCODONE AND ACETAMINOPHEN 10; 325 MG/1; MG/1
1 TABLET ORAL ONCE AS NEEDED
Status: DISCONTINUED | OUTPATIENT
Start: 2018-01-17 | End: 2018-01-17 | Stop reason: HOSPADM

## 2018-01-17 RX ORDER — NALOXONE HCL 0.4 MG/ML
0.4 VIAL (ML) INJECTION
Status: DISCONTINUED | OUTPATIENT
Start: 2018-01-17 | End: 2018-01-18 | Stop reason: HOSPADM

## 2018-01-17 RX ORDER — BISACODYL 10 MG
10 SUPPOSITORY, RECTAL RECTAL DAILY PRN
Status: DISCONTINUED | OUTPATIENT
Start: 2018-01-17 | End: 2018-01-18 | Stop reason: HOSPADM

## 2018-01-17 RX ORDER — FAMOTIDINE 10 MG/ML
20 INJECTION, SOLUTION INTRAVENOUS ONCE
Status: DISCONTINUED | OUTPATIENT
Start: 2018-01-17 | End: 2018-01-17

## 2018-01-17 RX ORDER — ACETAMINOPHEN 325 MG/1
650 TABLET ORAL EVERY 4 HOURS PRN
Status: DISCONTINUED | OUTPATIENT
Start: 2018-01-17 | End: 2018-01-18 | Stop reason: HOSPADM

## 2018-01-17 RX ORDER — ACETAMINOPHEN 325 MG/1
650 TABLET ORAL ONCE AS NEEDED
Status: DISCONTINUED | OUTPATIENT
Start: 2018-01-17 | End: 2018-01-17 | Stop reason: HOSPADM

## 2018-01-17 RX ORDER — NEOSTIGMINE METHYLSULFATE 1 MG/ML
INJECTION, SOLUTION INTRAVENOUS AS NEEDED
Status: DISCONTINUED | OUTPATIENT
Start: 2018-01-17 | End: 2018-01-17 | Stop reason: SURG

## 2018-01-17 RX ORDER — ROCURONIUM BROMIDE 10 MG/ML
INJECTION, SOLUTION INTRAVENOUS AS NEEDED
Status: DISCONTINUED | OUTPATIENT
Start: 2018-01-17 | End: 2018-01-17 | Stop reason: SURG

## 2018-01-17 RX ORDER — DEXAMETHASONE SODIUM PHOSPHATE 4 MG/ML
4 INJECTION, SOLUTION INTRA-ARTICULAR; INTRALESIONAL; INTRAMUSCULAR; INTRAVENOUS; SOFT TISSUE EVERY 6 HOURS SCHEDULED
Status: DISCONTINUED | OUTPATIENT
Start: 2018-01-17 | End: 2018-01-18 | Stop reason: HOSPADM

## 2018-01-17 RX ORDER — PROMETHAZINE HYDROCHLORIDE 25 MG/ML
6.25 INJECTION, SOLUTION INTRAMUSCULAR; INTRAVENOUS ONCE AS NEEDED
Status: COMPLETED | OUTPATIENT
Start: 2018-01-17 | End: 2018-01-17

## 2018-01-17 RX ORDER — DEXAMETHASONE SODIUM PHOSPHATE 4 MG/ML
INJECTION, SOLUTION INTRA-ARTICULAR; INTRALESIONAL; INTRAMUSCULAR; INTRAVENOUS; SOFT TISSUE AS NEEDED
Status: DISCONTINUED | OUTPATIENT
Start: 2018-01-17 | End: 2018-01-17 | Stop reason: SURG

## 2018-01-17 RX ORDER — ACETAMINOPHEN 650 MG/1
650 SUPPOSITORY RECTAL ONCE AS NEEDED
Status: DISCONTINUED | OUTPATIENT
Start: 2018-01-17 | End: 2018-01-17 | Stop reason: HOSPADM

## 2018-01-17 RX ORDER — PROMETHAZINE HYDROCHLORIDE 25 MG/1
25 SUPPOSITORY RECTAL ONCE AS NEEDED
Status: COMPLETED | OUTPATIENT
Start: 2018-01-17 | End: 2018-01-17

## 2018-01-17 RX ORDER — ONDANSETRON 2 MG/ML
4 INJECTION INTRAMUSCULAR; INTRAVENOUS ONCE AS NEEDED
Status: COMPLETED | OUTPATIENT
Start: 2018-01-17 | End: 2018-01-17

## 2018-01-17 RX ORDER — DEXAMETHASONE 4 MG/1
4 TABLET ORAL EVERY 6 HOURS SCHEDULED
Status: DISCONTINUED | OUTPATIENT
Start: 2018-01-17 | End: 2018-01-18 | Stop reason: HOSPADM

## 2018-01-17 RX ORDER — MAGNESIUM HYDROXIDE 1200 MG/15ML
LIQUID ORAL AS NEEDED
Status: DISCONTINUED | OUTPATIENT
Start: 2018-01-17 | End: 2018-01-17 | Stop reason: HOSPADM

## 2018-01-17 RX ORDER — DEXTROSE MONOHYDRATE 25 G/50ML
25 INJECTION, SOLUTION INTRAVENOUS
Status: DISCONTINUED | OUTPATIENT
Start: 2018-01-17 | End: 2018-01-18 | Stop reason: HOSPADM

## 2018-01-17 RX ORDER — FENTANYL CITRATE 50 UG/ML
INJECTION, SOLUTION INTRAMUSCULAR; INTRAVENOUS AS NEEDED
Status: DISCONTINUED | OUTPATIENT
Start: 2018-01-17 | End: 2018-01-17 | Stop reason: SURG

## 2018-01-17 RX ORDER — HYDROMORPHONE HCL 110MG/55ML
PATIENT CONTROLLED ANALGESIA SYRINGE INTRAVENOUS AS NEEDED
Status: DISCONTINUED | OUTPATIENT
Start: 2018-01-17 | End: 2018-01-17 | Stop reason: SURG

## 2018-01-17 RX ADMIN — DEXAMETHASONE SODIUM PHOSPHATE 8 MG: 4 INJECTION, SOLUTION INTRAMUSCULAR; INTRAVENOUS at 08:42

## 2018-01-17 RX ADMIN — METOPROLOL TARTRATE 1 MG: 5 INJECTION, SOLUTION INTRAVENOUS at 09:10

## 2018-01-17 RX ADMIN — PROPOFOL 10 MG: 10 INJECTION, EMULSION INTRAVENOUS at 10:28

## 2018-01-17 RX ADMIN — DEXAMETHASONE 4 MG: 4 TABLET ORAL at 16:22

## 2018-01-17 RX ADMIN — FENTANYL CITRATE 25 MCG: 50 INJECTION, SOLUTION INTRAMUSCULAR; INTRAVENOUS at 10:33

## 2018-01-17 RX ADMIN — HYDROMORPHONE HYDROCHLORIDE 0.5 MG: 2 INJECTION INTRAMUSCULAR; INTRAVENOUS; SUBCUTANEOUS at 09:08

## 2018-01-17 RX ADMIN — OXYCODONE HYDROCHLORIDE AND ACETAMINOPHEN 1 TABLET: 7.5; 325 TABLET ORAL at 13:44

## 2018-01-17 RX ADMIN — LIDOCAINE HYDROCHLORIDE 0.5 ML: 10 INJECTION, SOLUTION EPIDURAL; INFILTRATION; INTRACAUDAL; PERINEURAL at 06:53

## 2018-01-17 RX ADMIN — FAMOTIDINE 20 MG: 20 TABLET, FILM COATED ORAL at 06:55

## 2018-01-17 RX ADMIN — LIDOCAINE HYDROCHLORIDE 50 MG: 10 INJECTION, SOLUTION EPIDURAL; INFILTRATION; INTRACAUDAL; PERINEURAL at 08:24

## 2018-01-17 RX ADMIN — ONDANSETRON 4 MG: 2 INJECTION INTRAMUSCULAR; INTRAVENOUS at 13:44

## 2018-01-17 RX ADMIN — CEFAZOLIN SODIUM 2 G: 2 INJECTION, SOLUTION INTRAVENOUS at 16:23

## 2018-01-17 RX ADMIN — ROCURONIUM BROMIDE 50 MG: 10 SOLUTION INTRAVENOUS at 08:24

## 2018-01-17 RX ADMIN — FENTANYL CITRATE 25 MCG: 50 INJECTION, SOLUTION INTRAMUSCULAR; INTRAVENOUS at 09:19

## 2018-01-17 RX ADMIN — FENTANYL CITRATE 50 MCG: 50 INJECTION INTRAMUSCULAR; INTRAVENOUS at 11:17

## 2018-01-17 RX ADMIN — HYDROMORPHONE HYDROCHLORIDE 1 MG: 2 INJECTION INTRAMUSCULAR; INTRAVENOUS; SUBCUTANEOUS at 08:24

## 2018-01-17 RX ADMIN — ONDANSETRON 4 MG: 2 INJECTION INTRAMUSCULAR; INTRAVENOUS at 10:58

## 2018-01-17 RX ADMIN — SODIUM CHLORIDE, POTASSIUM CHLORIDE, SODIUM LACTATE AND CALCIUM CHLORIDE: 600; 310; 30; 20 INJECTION, SOLUTION INTRAVENOUS at 09:17

## 2018-01-17 RX ADMIN — VECURONIUM BROMIDE 3 MG: 1 INJECTION, POWDER, LYOPHILIZED, FOR SOLUTION INTRAVENOUS at 09:07

## 2018-01-17 RX ADMIN — ONDANSETRON 4 MG: 2 INJECTION INTRAMUSCULAR; INTRAVENOUS at 10:20

## 2018-01-17 RX ADMIN — FENTANYL CITRATE 25 MCG: 50 INJECTION, SOLUTION INTRAMUSCULAR; INTRAVENOUS at 09:30

## 2018-01-17 RX ADMIN — FENTANYL CITRATE 25 MCG: 50 INJECTION, SOLUTION INTRAMUSCULAR; INTRAVENOUS at 10:39

## 2018-01-17 RX ADMIN — PROPOFOL 200 MG: 10 INJECTION, EMULSION INTRAVENOUS at 08:24

## 2018-01-17 RX ADMIN — FAMOTIDINE 20 MG: 20 TABLET, FILM COATED ORAL at 22:04

## 2018-01-17 RX ADMIN — POTASSIUM CHLORIDE AND SODIUM CHLORIDE 100 ML/HR: 900; 150 INJECTION, SOLUTION INTRAVENOUS at 11:29

## 2018-01-17 RX ADMIN — METOPROLOL TARTRATE 1 MG: 5 INJECTION, SOLUTION INTRAVENOUS at 09:12

## 2018-01-17 RX ADMIN — SODIUM CHLORIDE, POTASSIUM CHLORIDE, SODIUM LACTATE AND CALCIUM CHLORIDE 9 ML/HR: 600; 310; 30; 20 INJECTION, SOLUTION INTRAVENOUS at 06:53

## 2018-01-17 RX ADMIN — MUPIROCIN: 20 OINTMENT TOPICAL at 06:55

## 2018-01-17 RX ADMIN — Medication 2 G: at 08:20

## 2018-01-17 RX ADMIN — VECURONIUM BROMIDE 1 MG: 1 INJECTION, POWDER, LYOPHILIZED, FOR SOLUTION INTRAVENOUS at 09:31

## 2018-01-17 RX ADMIN — METOPROLOL TARTRATE 1 MG: 5 INJECTION, SOLUTION INTRAVENOUS at 09:09

## 2018-01-17 RX ADMIN — FENTANYL CITRATE 50 MCG: 50 INJECTION INTRAMUSCULAR; INTRAVENOUS at 11:01

## 2018-01-17 RX ADMIN — METOPROLOL TARTRATE 2 MG: 5 INJECTION, SOLUTION INTRAVENOUS at 09:14

## 2018-01-17 RX ADMIN — PROMETHAZINE HYDROCHLORIDE 6.25 MG: 25 INJECTION INTRAMUSCULAR; INTRAVENOUS at 11:17

## 2018-01-17 RX ADMIN — GLYCOPYRROLATE 0.8 MG: 0.2 INJECTION, SOLUTION INTRAMUSCULAR; INTRAVENOUS at 10:22

## 2018-01-17 RX ADMIN — HYDROMORPHONE HYDROCHLORIDE 0.5 MG: 2 INJECTION INTRAMUSCULAR; INTRAVENOUS; SUBCUTANEOUS at 08:44

## 2018-01-17 RX ADMIN — LIDOCAINE HYDROCHLORIDE 100 MG: 10 INJECTION, SOLUTION EPIDURAL; INFILTRATION; INTRACAUDAL; PERINEURAL at 10:28

## 2018-01-17 RX ADMIN — NEOSTIGMINE METHYLSULFATE 5 MG: 1 INJECTION, SOLUTION INTRAVENOUS at 10:22

## 2018-01-17 RX ADMIN — OXYCODONE HYDROCHLORIDE AND ACETAMINOPHEN 0.5 TABLET: 7.5; 325 TABLET ORAL at 18:36

## 2018-01-17 NOTE — PLAN OF CARE
Problem: Patient Care Overview (Adult)  Goal: Plan of Care Review  Outcome: Ongoing (interventions implemented as appropriate)   01/17/18 1515   Coping/Psychosocial Response Interventions   Plan Of Care Reviewed With patient;spouse   Patient Care Overview   Progress progress toward functional goals as expected   Outcome Evaluation   Outcome Summary/Follow up Plan Patient ambulated 550 feet, no unsteadiness or loss of balance with gait. CGA for all OOB mobility. Plan is d/c home in AM with family. Progress to stair training in AM.        Problem: Inpatient Physical Therapy  Goal: Bed Mobility Goal LTG- PT  Outcome: Outcome(s) achieved Date Met: 01/17/18 01/17/18 1515   Bed Mobility PT LTG   Bed Mobility PT LTG, Date Established 01/17/18   Bed Mobility PT LTG, Time to Achieve 3 days   Bed Mobility PT LTG, Activity Type supine to sit/sit to supine   Bed Mobility PT LTG, [place] Level conditional independence   Bed Mobility PT LTG, Date Goal Reviewed 01/17/18   Bed Mobility PT LTG, Outcome goal met     Goal: Transfer Training Goal 1 LTG- PT  Outcome: Ongoing (interventions implemented as appropriate)   01/17/18 1515   Transfer Training PT LTG   Transfer Training PT LTG, Date Established 01/17/18   Transfer Training PT LTG, Time to Achieve 3 days   Transfer Training PT LTG, Activity Type sit to stand/stand to sit   Transfer Training PT LTG, [place] Level independent   Transfer Training PT LTG, Assist Device other (see comments)  (no AD)   Transfer Training PT LTG, Date Goal Reviewed 01/17/18   Transfer Training PT LTG, Outcome goal ongoing     Goal: Gait Training Goal LTG- PT  Outcome: Ongoing (interventions implemented as appropriate)   01/17/18 1515   Gait Training PT LTG   Gait Training Goal PT LTG, Date Established 01/17/18   Gait Training Goal PT LTG, Time to Achieve 3 days   Gait Training Goal PT LTG, [place] Level independent   Gait Training Goal PT LTG, Assist Device other (see comments)  (no AD)    Gait Training Goal PT LTG, Distance to Achieve 1,000 feet   Gait Training Goal PT LTG, Date Goal Reviewed 01/17/18   Gait Training Goal PT LTG, Outcome goal ongoing     Goal: Stair Training Goal STG- PT  Outcome: Ongoing (interventions implemented as appropriate)   01/17/18 1515   Stair Training PT STG   Stair Training Goal PT STG, Date Established 01/17/18   Stair Training Goal PT STG, Time to Achieve 3 days   Stair Training Goal PT STG, Number of Steps 2   Stair Training Goal PT STG, [place] Level contact guard assist   Stair Training Goal PT STG, Assist Device other (see comments)  (no handrails)   Stair Training Goal PT STG, Date Goal Reviewed 01/17/18   Stair Training Goal PT STG, Outcome goal ongoing     Goal: Stair Training Goal LTG- PT  Outcome: Ongoing (interventions implemented as appropriate)   01/17/18 1515   Stair Training PT LTG   Stair Training Goal PT LTG, Date Established 01/17/18   Stair Training Goal PT LTG, Time to Achieve 3 days   Stair Training Goal PT LTG, Number of Steps 8   Stair Training Goal PT LTG, [place] Level contact guard assist   Stair Training Goal PT LTG, Assist Device 2 handrails   Stair Training Goal PT LTG, Date Goal Reviewed 01/17/18   Stair Training Goal PT LTG, Outcome goal ongoing

## 2018-01-17 NOTE — THERAPY EVALUATION
Acute Care - Physical Therapy Initial Evaluation  [facility]     Patient Name: Marcie Russ  : 1985  MRN: [id number]  Today's Date: 2018   Onset of Illness/Injury or Date of Surgery Date: 18  Date of Referral to PT: 18  Referring Physician: MD Dario      Admit Date: 2018     Visit Dx:    ICD-10-CM ICD-9-CM   1. Impaired functional mobility, balance, gait, and endurance Z74.09 V49.89     Patient Active Problem List   Diagnosis   • Neck pain     Past Medical History:   Diagnosis Date   • Arthritis    • Eczema    • GERD (gastroesophageal reflux disease)    • Headache    • Neck pain    • PCOS (polycystic ovarian syndrome)    • Wears eyeglasses      Past Surgical History:   Procedure Laterality Date   • CERVICAL FUSION  2012    ACDF C5-6 (unknown dr/[facility])   •  SECTION     • DILATATION AND CURETTAGE     • LASER ABLATION CONDYLOMA CERVICAL / VULVAR     • TUBAL ABDOMINAL LIGATION            PT ASSESSMENT (last 72 hours)      PT Evaluation       18 1515 18 1413    Rehab Evaluation    Document Type evaluation  -LR     Subjective Information agree to therapy;complains of;pain   tingling in R hand  -LR     Patient Effort, Rehab Treatment excellent  -LR     Symptoms Noted During/After Treatment other (see comments)   Increased R hand tingling  -LR     Symptoms Noted Comment RN in to assess.   -LR     General Information    Patient Profile Review yes  -LR     Onset of Illness/Injury or Date of Surgery Date 18  -LR     Referring Physician MD Dario  -LR     General Observations Patient supine in bed upon arrival. IV, pulse ox, SCDs.   -LR     Pertinent History Of Current Problem Patient presents for surgical management of persistent and progressive neck pain that radiated down R UE and numbness/tingling in R UE.   -LR     Precautions/Limitations fall precautions;other (see comments);spinal precautions   cervical precautions.   -LR     Prior Level of  Function independent:;all household mobility;community mobility;gait;transfer;bed mobility;shopping;using stairs;driving;min assist:;home management;cooking;cleaning   R  weakness  -LR     Equipment Currently Used at Home grab bar  -LR none  -DK    Plans/Goals Discussed With patient;spouse/S.O.;agreed upon  -LR     Risks Reviewed patient:;spouse/S.O.:;nausea/vomiting;LOB;dizziness;increased discomfort;lines disloged  -LR     Benefits Reviewed patient:;spouse/S.O.:;improve function;increase independence;increase strength;increase balance;decrease pain;increase knowledge  -LR     Barriers to Rehab none identified  -LR     Living Environment    Lives With spouse  -LR spouse  -DK    Living Arrangements house  -LR house  -DK    Home Accessibility bed and bath are not on the first floor;grab bars present (bathtub);house is not wheelchair accessible;stairs to enter home;stairs within home;tub/shower is not walk in  -LR bed and bath on same level;stairs to enter home  -DK    Number of Stairs to Enter Home 8  -LR 8  -DK    Number of Stairs Within Home --   1 step down to kitchen, 2 down into bedroom  -LR     Stair Railings at Home none;outside, present at both sides   concrete columns on either side to use for support.   -LR none  -DK    Type of Financial/Environmental Concern none  -LR --   Pt has [employer] insurance to cover her medication.  -DK    Transportation Available family or friend will provide  -LR car  -DK    Living Environment Comment  available to assist at all times upon d/c home for the first week or so.   -LR --   Pt lives with spouse in 1 level home in [facility].  -DK    Clinical Impression    Date of Referral to PT 01/17/18  -LR     PT Diagnosis herniated disc with radiculopathy C4-5, s/p C5-6 fusion/ s/p ACD and osteophytectomy C4-5 to decompress neural elements, anterior interbody fusion, C4-5 with interbody fusion cage and bone graft, anterior cervical plate C4-5, removal of plate C5-6 and  insepction of fusion  -LR     Prognosis good  -LR     Functional Level At Time Of Evaluation    -LR     Patient/Family Goals Statement go home, decrease pain  -LR     Criteria for Skilled Therapeutic Interventions Met yes;treatment indicated  -LR     Rehab Potential good, to achieve stated therapy goals  -LR     Pain Assessment    Pain Assessment 0-10  -LR     Pain Score 7  -LR     Post Pain Score 7  -LR     Pain Type Acute pain  -LR     Pain Location Neck  -LR     Pain Orientation Anterior  -LR     Pain Radiating Towards R UE, to elbow  -LR     Pain Intervention(s) Repositioned;Ambulation/increased activity  -LR     Vision Assessment/Intervention    Visual Impairment WFL  -LR     Cognitive Assessment/Intervention    Current Cognitive/Communication Assessment functional  -LR     Orientation Status oriented x 4;required verbal cueing (specifiy in comments)  -LR     Follows Commands/Answers Questions 100% of the time;able to follow single-step instructions;needs cueing;needs repetition  -LR     Personal Safety WNL/WFL  -LR     ROM (Range of Motion)    General ROM no range of motion deficits identified  -LR     MMT (Manual Muscle Testing)    General MMT Assessment upper extremity strength deficits identified  -LR     Upper Extremity    Upper Ext Manual Muscle Testing Detail R  strength 4/5  -LR     Bed Mobility, Assessment/Treatment    Bed Mobility, Assistive Device head of bed elevated;bed rails  -LR     Bed Mob, Supine to Sit, [place] verbal cues required;conditional independence  -LR     Bed Mob, Sit to Supine, [place] verbal cues required;conditional independence  -LR     Bed Mobility, Impairments pain  -LR     Bed Mobility, Comment Verbal cues for correct log roll technique and to keep hips and shoulders aligned throughout.   -LR     Transfer Assessment/Treatment    Transfers, Sit-Stand [place] verbal cues required;stand by assist  -LR     Transfers, Stand-Sit [place] verbal cues  required;stand by assist  -LR     Transfers, Sit-Stand-Sit, Assist Device other (see comments)   no AD required  -LR     Transfer, Safety Issues step length decreased  -LR     Transfer, Impairments strength decreased;pain  -LR     Transfer, Comment Verbal cues for correct hand placement with t/f. Patient denied dizziness upon standing up.   -LR     Gait Assessment/Treatment    Gait, [place] Level verbal cues required;contact guard assist  -LR     Gait, Assistive Device other (see comments)   no AD required  -LR     Gait, Distance (Feet) 550  -LR     Gait, Gait Pattern Analysis swing-through gait  -LR     Gait, Gait Deviations bilateral:;step length decreased  -LR     Gait, Impairments pain  -LR     Gait, Comment Patient ambulated with step through gait pattern at slow pace with good arm swing. No unsteadiness or LOB with ambulation. Gait limited by fatigue.   -LR     Therapy Exercises    Exercise Protocols --   initiate HEP POD#1, PT reviewed and demonstrated  -LR     Sensory Assessment/Intervention    Sensory Impairment tingling;numbness   R hand and fingers  -LR     Light Touch LUE;RUE  -LR     LUE Light Touch WNL  -LR     RUE Light Touch other (see comments)   absent thumb/middle finger;diminshed index finger  -LR     Positioning and Restraints    Pre-Treatment Position in bed  -LR     Post Treatment Position bed  -LR     In Bed notified nsg;supine;call light within reach;encouraged to call for assist;with family/caregiver;side rails up x2;SCD pump applied  -LR       User Key  (r) = Recorded By, (t) = Taken By, (c) = Cosigned By    Initials Name Provider Type    LR Tana Dunn, IVETT Physical Therapist    NIKOLE Meehan, RN Case Manager          Physical Therapy Education     Title: PT OT SLP Therapies (Done)     Topic: Physical Therapy (Done)     Point: Mobility training (Done)    Learning Progress Summary    Learner Readiness Method Response Comment Documented by Status   Patient Acceptance  E,D VU,NR Educated on cervical precautions, log roll technique, and HEP. LR 01/17/18 1620 Done               Point: Home exercise program (Done)    Learning Progress Summary    Learner Readiness Method Response Comment Documented by Status   Patient Acceptance E,D VU,NR Educated on cervical precautions, log roll technique, and HEP. LR 01/17/18 1620 Done               Point: Body mechanics (Done)    Learning Progress Summary    Learner Readiness Method Response Comment Documented by Status   Patient Acceptance E,D VU,NR Educated on cervical precautions, log roll technique, and HEP. LR 01/17/18 1620 Done               Point: Precautions (Done)    Learning Progress Summary    Learner Readiness Method Response Comment Documented by Status   Patient Acceptance E,D VU,NR Educated on cervical precautions, log roll technique, and HEP. LR 01/17/18 1620 Done                      User Key     Initials Effective Dates Name Provider Type Discipline    LR 06/19/15 -  Tana Dunn, PT Physical Therapist PT                PT Recommendation and Plan  Anticipated Equipment Needs At Discharge:  (none)  Anticipated Discharge Disposition: home with assist  Planned Therapy Interventions: gait training, home exercise program, patient/family education, stair training, strengthening, transfer training, bed mobility training  PT Frequency: daily  Plan of Care Review  Plan Of Care Reviewed With: patient, spouse  Progress: progress toward functional goals as expected  Outcome Summary/Follow up Plan: Patient ambulated 550 feet, no unsteadiness or loss of balance with gait. CGA for all OOB mobility. Plan is d/c home in AM with family. Progress to stair training in AM.           IP PT Goals       01/17/18 1515          Bed Mobility PT LTG    Bed Mobility PT LTG, Date Established 01/17/18  -LR      Bed Mobility PT LTG, Time to Achieve 3 days  -LR      Bed Mobility PT LTG, Activity Type supine to sit/sit to supine  -LR      Bed Mobility PT  LTG, [place] Level conditional independence  -LR      Bed Mobility PT LTG, Date Goal Reviewed 01/17/18  -LR      Bed Mobility PT LTG, Outcome goal met  -LR      Transfer Training PT LTG    Transfer Training PT LTG, Date Established 01/17/18  -LR      Transfer Training PT LTG, Time to Achieve 3 days  -LR      Transfer Training PT LTG, Activity Type sit to stand/stand to sit  -LR      Transfer Training PT LTG, [place] Level independent  -LR      Transfer Training PT LTG, Assist Device other (see comments)   no AD  -LR      Transfer Training PT  LTG, Date Goal Reviewed 01/17/18  -LR      Transfer Training PT LTG, Outcome goal ongoing  -LR      Gait Training PT LTG    Gait Training Goal PT LTG, Date Established 01/17/18  -LR      Gait Training Goal PT LTG, Time to Achieve 3 days  -LR      Gait Training Goal PT LTG, [place] Level independent  -LR      Gait Training Goal PT LTG, Assist Device other (see comments)   no AD  -LR      Gait Training Goal PT LTG, Distance to Achieve 1,000 feet  -LR      Gait Training Goal PT LTG, Date Goal Reviewed 01/17/18  -LR      Gait Training Goal PT LTG, Outcome goal ongoing  -LR      Stair Training PT STG    Stair Training Goal PT STG, Date Established 01/17/18  -LR      Stair Training Goal PT STG, Time to Achieve 3 days  -LR      Stair Training Goal PT STG, Number of Steps 2  -LR      Stair Training Goal PT STG, [place] Level contact guard assist  -LR      Stair Training Goal PT STG, Assist Device other (see comments)   no handrails  -LR      Stair Training Goal PT STG, Date Goal Reviewed 01/17/18  -LR      Stair Training Goal PT STG, Outcome goal ongoing  -LR      Stair Training PT LTG    Stair Training Goal PT LTG, Date Established 01/17/18  -LR      Stair Training Goal PT LTG, Time to Achieve 3 days  -LR      Stair Training Goal PT LTG, Number of Steps 8  -LR      Stair Training Goal PT LTG, [place] Level contact guard assist  -LR      Stair Training Goal PT  LTG, Assist Device 2 handrails  -LR      Stair Training Goal PT LTG, Date Goal Reviewed 01/17/18  -LR      Stair Training Goal PT LTG, Outcome goal ongoing  -LR        User Key  (r) = Recorded By, (t) = Taken By, (c) = Cosigned By    Initials Name Provider Type    LR Tana Dunn, PT Physical Therapist                Outcome Measures       01/17/18 1515          How much help from another person do you currently need...    Turning from your back to your side while in flat bed without using bedrails? 4  -LR      Moving from lying on back to sitting on the side of a flat bed without bedrails? 4  -LR      Moving to and from a bed to a chair (including a wheelchair)? 3  -LR      Standing up from a chair using your arms (e.g., wheelchair, bedside chair)? 3  -LR      Climbing 3-5 steps with a railing? 3  -LR      To walk in hospital room? 3  -LR      AM-PAC 6 Clicks Score 20  -LR      Functional Assessment    Outcome Measure Options AM-PAC 6 Clicks Basic Mobility (PT)  -LR        User Key  (r) = Recorded By, (t) = Taken By, (c) = Cosigned By    Initials Name Provider Type    LR Tana Dunn, PT Physical Therapist           Time Calculation:         PT Charges       01/17/18 1621          Time Calculation    Start Time 1515  -LR      PT Received On 01/17/18  -LR      PT Goal Re-Cert Due Date 01/27/18  -LR      Time Calculation- PT    Total Timed Code Minutes- PT 0 minute(s)  -LR        User Key  (r) = Recorded By, (t) = Taken By, (c) = Cosigned By    Initials Name Provider Type    MINESH Dunn, PT Physical Therapist          Therapy Charges for Today     Code Description Service Date Service Provider Modifiers Qty    [id number] HC PT EVAL LOW COMPLEXITY 4 1/17/2018 Tana Dunn, PT GP 1          PT G-Codes  Outcome Measure Options: AM-PAC 6 Clicks Basic Mobility (PT)      Tana Dunn, PT  1/17/2018

## 2018-01-17 NOTE — PROGRESS NOTES
Discharge Planning Assessment  [facility]     Patient Name: Marcie Russ  MRN: [id number]  Today's Date: 1/17/2018    Admit Date: 1/17/2018          Discharge Needs Assessment       01/17/18 1413    Living Environment    Lives With spouse    Living Arrangements house    Home Accessibility bed and bath on same level;stairs to enter home    Number of Stairs to Enter Home 8    Stair Railings at Home none    Type of Financial/Environmental Concern --   Pt has [place] insurance to cover her medication.    Transportation Available car    Living Environment Comment --   Pt lives with spouse in 1 level home in [facility].    Living Environment    Provides Primary Care For no one    Primary Care Provided By spouse/significant other    Quality Of Family Relationships supportive;helpful;involved    Able to Return to Prior Living Arrangements yes    Discharge Needs Assessment    Concerns To Be Addressed no discharge needs identified    Readmission Within The Last 30 Days no previous admission in last 30 days    Equipment Currently Used at Home none    Equipment Needed After Discharge walker, rolling    Discharge Disposition still a patient    Discharge Contact Information if Applicable Shahbaz Russ spouse  [phone]            Discharge Plan       01/17/18 1416    Case Management/Social Work Plan    Plan Home with spouse at discharge.    Patient/Family In Agreement With Plan yes    Additional Comments Met with pt and spouse at , pt states she was independent with ADL's and mobility prior to admission. Pt has insurance to cover  Pt has not had HH or DME. Pt plans on going home with spouse at discharge.        Discharge Placement     No information found                Demographic Summary       01/17/18 1411    Referral Information    Admission Type outpatient in a bed    Arrived From home or self-care    Referral Source admission list    Reason For Consult discharge planning    Record Reviewed clinical discipline  documentation;history and physical    Contact Information    Permission Granted to Share Information With     Primary Care Physician Information    Name Drew Bishop            Functional Status       01/17/18 9774    Functional Status Prior    Ambulation 0-->independent    Transferring 0-->independent    Toileting 0-->independent    Bathing 0-->independent    Dressing 0-->independent    Eating 0-->independent    Communication 0-->understands/communicates without difficulty    Swallowing 0-->swallows foods/liquids without difficulty    Prior Functional Level Comment independent    IADL    Medications independent    Meal Preparation independent    Housekeeping independent    Laundry independent    Shopping independent    Oral Care independent    IADL Comments independent    Activity Tolerance    Usual Activity Tolerance excellent            Psychosocial     None            Abuse/Neglect     None            Legal     None            Substance Abuse     None            Patient Forms     None          Connie Meehan RN

## 2018-01-17 NOTE — ANESTHESIA PREPROCEDURE EVALUATION
Anesthesia Evaluation     Patient summary reviewed and Nursing notes reviewed          Airway   Mallampati: II  Dental      Pulmonary - negative pulmonary ROS   Cardiovascular - negative cardio ROS        Neuro/Psych- negative ROS  GI/Hepatic/Renal/Endo - negative ROS     Musculoskeletal     (+) neck pain,   Abdominal    Substance History - negative use     OB/GYN negative ob/gyn ROS         Other                                                Anesthesia Plan    ASA 3     general     intravenous induction   Anesthetic plan and risks discussed with patient.

## 2018-01-17 NOTE — OP NOTE
PREOPERATIVE DIAGNOSES:   1.  Herniated disk with radiculopathy C4-C5.   2.  Status post C5-C6 fusion.     POSTOPERATIVE DIAGNOSES:   1.  Herniated disk with radiculopathy, C4-C5.   2.  Status post C5-C6 fusion.     PROCEDURES:   1.  Anterior cervical diskectomy and osteophytectomy C4-C5 to decompress neural elements.   2.  Anterior interbody fusion C4-C5 with DePuy interbody fusion cage and bone graft.  3.  Placement of anterior cervical plate C4-C5 (DePuy Del[place] plate).  4.  Removal of plate C5-C6 and inspection of fusion, fusion appears solid.   5.  Harvesting a bone graft locally from vertebral bodies and osteophytes.    SURGEON: Travis Bishop MD    ASSISTANT: MARY Hampton    ANESTHESIA: General.    DESCRIPTION OF PROCEDURE: The patient was given a preoperative dose of intravenous Kefzol. She was given a general anesthetic. She was placed in the supine position. The neck was prepped and draped sterilely. An approximate 4 cm right-sided transverse incision was made over the anterior cervical. The platysma was split transversely. I identified scar tissue from the previous operation. I followed the medial border of the SCN down to the prevertebral fascia. I carefully  tissues. I identified the plate at C5-C6. I elevated the scar tissue off this. I removed 4 bone screws and 1 plate. The fusion was inspected and noted to be solid at C5-C6.     I moved cephalad to C4-C5. I elevated the longus colli muscle. Self-retaining retractors were placed below this and the anesthesiologist was asked to deflate the endotracheal cup and re-inflate with smallest amount of pressure possible. There were large anterior osteophytes at C4-C5. A complete diskectomy was performed at C4-C5 with Pituitary curettes and [place]. The disk was markedly degenerate. Compressing osteophytes were excised with the marya. Anterior osteophytes were excised. Bone removed from the vertebral body was morselized and used as bone graft. The  neural elements of C4-C5 appeared completely decompressed at this point.     Anterior interbody fusion was performed at C4-C5. I trialed appropriate sized cages and the cage selected was a standard size, size 6 DePuy cage. It was packed with bone graft. The endplates at C4-C5 were lightly decorticated, the disk was modestly distracted, the cage at C4-C5 was placed without complication. Under C-arm it appeared in good position.     I then stabilized C4-C5 with an anterior cervical plate. The type of plate used was a DePuy Connell plate. It was fixed with 2 bone screws in C4 and 2 bone screws in C5.  The screws obtained good bony purchase. The tightening clamps were tightened to lock the screw to the plate. Final C-arm images showed all hardware in good position.     A final port tightening was done of all screws. A deep Hemovac drain was placed. The wound was copiously irrigated. The wound appeared dry at completion.     The wound was closed with layers using Vicryl. A sterile dressing was applied. The patient was awakened and transported to recovery room in stable condition.

## 2018-01-17 NOTE — ANESTHESIA POSTPROCEDURE EVALUATION
Patient: Marcie Russ    Procedure Summary     Date Anesthesia Start Anesthesia Stop Room / Location    01/17/18 0821 1051 [facility] / [facility]       Procedure Diagnosis Surgeon Provider    CERVICAL DISCECTOMY ANTERIOR WITH FUSION AND INSTRUMENTATION (N/A Spine Cervical) No diagnosis on file. MD Misael Valles MD          Anesthesia Type: general  Last vitals  BP   122/78 (01/17/18 0651)   Temp   97.3 °F (36.3 °C) (01/17/18 0651)   Pulse   89 (01/17/18 0651)   Resp   18 (01/17/18 0651)     SpO2   96 % (01/17/18 0651)     Post Anesthesia Care and Evaluation    Patient location during evaluation: PACU  Patient participation: complete - patient participated  Level of consciousness: awake and alert  Pain score: 0  Pain management: adequate  Airway patency: patent  Anesthetic complications: No anesthetic complications  PONV Status: none  Cardiovascular status: hemodynamically stable and acceptable  Respiratory status: nonlabored ventilation, acceptable and nasal cannula  Hydration status: acceptable

## 2018-01-17 NOTE — H&P
Patient Name: Marcie Russ  MRN: [id number]  : 1985  DOS: 2018    Attending: Travis Bishop MD    Primary Care Provider: IDALMIS Vazquez      Chief complaint:  Neck pain    Subjective   Patient is a 32 y.o. female presented for scheduled surgery listed below by Dr. Bishop under GA. She tolerated surgery well and is admitted for further medical management. Her neck and right shoulder have been painful for about 1 year. She had previous cervical fusion in .     PROCEDURES:   1.  Anterior cervical diskectomy and osteophytectomy C4-C5 to decompress neural elements.   2.  Anterior interbody fusion C4-C5 with DePuy interbody fusion cage and bone graft.  3.  Placement of anterior cervical plate C4-C5 (DePuy [place] plate).  4.  Removal of plate C5-C6 and inspection of fusion, fusion appears solid.   5.  Harvesting a bone graft locally from vertebral bodies and osteophytes.    When seen postop she is doing well. Her pain is well controlled. She complains of mild sore throat, mild nausea and numbness of right hand. She denies shortness of breath or chest pain. No hx of DVT or PE.    ( above is noted/agree. Seen in her room afterwards, pt is doing well. She already ambulated once. Her RUE pain and numbness has improved. Pain control is ok.)wy    Allergies:  No Known Allergies    Meds:  Prescriptions Prior to Admission   Medication Sig Dispense Refill Last Dose   • acetaminophen (TYLENOL) 500 MG tablet Take 500 mg by mouth Every 6 (Six) Hours As Needed for Mild Pain .   Past Week at Unknown time   • diclofenac (VOLTAREN) 75 MG EC tablet Take 1 tablet by mouth 2 (Two) Times a Day. 60 tablet 1 Past Month at Unknown time   • metFORMIN (GLUCOPHAGE) 500 MG tablet Take 500 mg by mouth Daily With Breakfast.   2018 at 0830       History:   Past Medical History:   Diagnosis Date   • Arthritis    • Eczema    • GERD (gastroesophageal reflux disease)    • Headache    • Neck pain    • PCOS  "(polycystic ovarian syndrome)    • Wears eyeglasses      Past Surgical History:   Procedure Laterality Date   • CERVICAL FUSION  2012    ACDF C5-6 (unknown dr/BUCK Collazo)   •  SECTION     • DILATATION AND CURETTAGE     • LASER ABLATION CONDYLOMA CERVICAL / VULVAR     • TUBAL ABDOMINAL LIGATION       Family History   Problem Relation Age of Onset   • No Known Problems Neg Hx      Social History   Substance Use Topics   • Smoking status: Former Smoker     Packs/day: 0.50     Years: 20.00     Types: Cigarettes   • Smokeless tobacco: Never Used      Comment: quit 5 days ago   • Alcohol use No   She is  with 2 children. 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    Review of Systems  All systems were reviewed and negative except for:  Gastrointestinal: postitive for  constipation    Vital Signs  /66  Pulse 103  Temp 97.4 °F (36.3 °C) (Tympanic)   Resp 18  Ht 160 cm (63\")  Wt 102 kg (225 lb)  SpO2 93%  BMI 39.86 kg/m2    Physical Exam:    General Appearance:    Alert, cooperative, in no acute distress   Head:    Normocephalic, without obvious abnormality, atraumatic   Eyes:            Lids and lashes normal, conjunctivae and sclerae normal, no   icterus, no pallor, corneas clear, PERRLA   Ears:    Ears appear intact with no abnormalities noted   Neck:   Bulky surgical dressing CDI. LUC present. Soft c-collar present   Lungs:     Clear to auscultation,respirations regular, even and                   unlabored    Heart:    Regular rhythm and normal rate, normal S1 and S2, no            murmur, no gallop, no rub, no click   Abdomen:     Normal bowel sounds, no masses, no organomegaly, soft        non-tender, non-distended, no guarding, no rebound                 tenderness   Genitalia:    Deferred   Extremities:   Moves all extremities well, no edema, no cyanosis, no              redness   Pulses:   Pulses palpable and equal bilaterally   Skin:   No bleeding, bruising or rash   Neurologic:   Cranial nerves 2 " - 12 grossly intact, sensation intact (some numbness right hand)      I reviewed the patient's new clinical results.         Results from last 7 days  Lab Units 01/12/18  1305   WBC 10*3/mm3 10.09   HEMOGLOBIN g/dL 14.2   HEMATOCRIT % 42.1   PLATELETS 10*3/mm3 207           Invalid input(s): LABALBU, PROT  Lab Results   Component Value Date    HGBA1C 5.90 (H) 10/11/2017       Assessment and Plan:   Principal Problem:    S/P cervical spinal fusion  Active Problems:    Neck pain    Prediabetes      Plan  1. PT- ambulate  2. Pain control-prns   3. IS-encourage  4. DVT proph- mechs  5. Bowel regimen  6. Resume home medications as appropriate  7. Monitor post-op labs  8. DC planning for home, likely tomorrow    PreDM  - hgb A1c on 10/11/17  - Hold metformin while inpatient  - Accuchecks ACHS with low dose SSI    Seen and examined by me. Agree with above. Discussed with patient and .jose Javed MD  01/17/18  7:18 PM

## 2018-01-17 NOTE — ANESTHESIA PROCEDURE NOTES
Airway  Urgency: elective    Date/Time: 1/17/2018 8:24 AM  End Time:1/17/2018 8:26 AM  Airway not difficult    General Information and Staff    Patient location during procedure: OR  CRNA: JEFF CONRAD    Indications and Patient Condition  Indications for airway management: airway protection    Preoxygenated: yes  MILS maintained throughout  Mask difficulty assessment: 1 - vent by mask    Final Airway Details  Final airway type: endotracheal airway      Successful airway: ETT  Cuffed: yes   Successful intubation technique: video laryngoscopy (used glidescope to keep next neutral)  Endotracheal tube insertion site: oral  Blade: Jose J  Blade size: #3  ETT size: 7.0 mm  Placement verified by: chest auscultation and capnometry   Measured from: lips  ETT to lips (cm): 22  Number of attempts at approach: 1    Additional Comments  Negative epigastric sounds, Breath sound equal bilaterally with symmetric chest rise and fall

## 2018-01-17 NOTE — INTERVAL H&P NOTE
"[facility] Pre-op    Full history and physical note from office reviewed and updated.  See office note attached.    /78 (BP Location: Right arm, Patient Position: Lying)  Pulse 89  Temp 97.3 °F (36.3 °C) (Temporal Artery )   Resp 18  Ht 160 cm (63\")  Wt 102 kg (225 lb)  SpO2 96%  BMI 39.86 kg/m2    Physical Exam:      Cardiovascular - regular rate and rhythm, s1/s2. No heaves, rubs, gallops or murmurs. No peripheral edema.     Respiratory - Lungs clear to ascultation bilaterally. Unlabored respirations. No wheezes, crackles or rales.    IMM:  Influenza:  2017  Pneumococcal:  denies  Tetanus:  07/2017    Cancer Staging (if applicable)  Cancer Patient: __ yes _x_no __unknown__N/A; If yes, clinical stage T:__ N:__M:__, stage group or __N/A    LISETH Massey 1/17/2018 6:52 AM    "

## 2018-01-18 VITALS
HEART RATE: 99 BPM | TEMPERATURE: 97.8 F | DIASTOLIC BLOOD PRESSURE: 75 MMHG | WEIGHT: 225 LBS | BODY MASS INDEX: 39.87 KG/M2 | HEIGHT: 63 IN | RESPIRATION RATE: 18 BRPM | OXYGEN SATURATION: 98 % | SYSTOLIC BLOOD PRESSURE: 115 MMHG

## 2018-01-18 PROBLEM — D72.829 LEUKOCYTOSIS: Status: ACTIVE | Noted: 2018-01-18

## 2018-01-18 LAB
ANION GAP SERPL CALCULATED.3IONS-SCNC: 6 MMOL/L (ref 3–11)
BUN BLD-MCNC: 8 MG/DL (ref 9–23)
BUN/CREAT SERPL: 13.3 (ref 7–25)
CALCIUM SPEC-SCNC: 9.2 MG/DL (ref 8.7–10.4)
CHLORIDE SERPL-SCNC: 106 MMOL/L (ref 99–109)
CO2 SERPL-SCNC: 23 MMOL/L (ref 20–31)
CREAT BLD-MCNC: 0.6 MG/DL (ref 0.6–1.3)
DEPRECATED RDW RBC AUTO: 41.9 FL (ref 37–54)
ERYTHROCYTE [DISTWIDTH] IN BLOOD BY AUTOMATED COUNT: 13.7 % (ref 11.3–14.5)
GFR SERPL CREATININE-BSD FRML MDRD: 115 ML/MIN/1.73
GLUCOSE BLD-MCNC: 127 MG/DL (ref 70–100)
GLUCOSE BLDC GLUCOMTR-MCNC: 146 MG/DL (ref 70–130)
HCT VFR BLD AUTO: 41 % (ref 34.5–44)
HGB BLD-MCNC: 13.8 G/DL (ref 11.5–15.5)
MCH RBC QN AUTO: 28.6 PG (ref 27–31)
MCHC RBC AUTO-ENTMCNC: 33.7 G/DL (ref 32–36)
MCV RBC AUTO: 85.1 FL (ref 80–99)
PLATELET # BLD AUTO: 240 10*3/MM3 (ref 150–450)
PMV BLD AUTO: 10.2 FL (ref 6–12)
POTASSIUM BLD-SCNC: 3.7 MMOL/L (ref 3.5–5.5)
RBC # BLD AUTO: 4.82 10*6/MM3 (ref 3.89–5.14)
SODIUM BLD-SCNC: 135 MMOL/L (ref 132–146)
WBC NRBC COR # BLD: 12.68 10*3/MM3 (ref 3.5–10.8)

## 2018-01-18 PROCEDURE — 80048 BASIC METABOLIC PNL TOTAL CA: CPT | Performed by: NURSE PRACTITIONER

## 2018-01-18 PROCEDURE — 85027 COMPLETE CBC AUTOMATED: CPT | Performed by: NURSE PRACTITIONER

## 2018-01-18 PROCEDURE — 97116 GAIT TRAINING THERAPY: CPT

## 2018-01-18 PROCEDURE — 63710000001 DEXAMETHASONE PER 0.25 MG: Performed by: ORTHOPAEDIC SURGERY

## 2018-01-18 PROCEDURE — 25010000003 CEFAZOLIN IN DEXTROSE 2-4 GM/100ML-% SOLUTION: Performed by: ORTHOPAEDIC SURGERY

## 2018-01-18 PROCEDURE — 82962 GLUCOSE BLOOD TEST: CPT

## 2018-01-18 RX ORDER — HYDROCODONE BITARTRATE AND ACETAMINOPHEN 7.5; 325 MG/1; MG/1
1 TABLET ORAL EVERY 4 HOURS PRN
Qty: 40 TABLET | Refills: 0
Start: 2018-01-18 | End: 2018-08-26

## 2018-01-18 RX ORDER — DOCUSATE SODIUM 100 MG/1
100 CAPSULE, LIQUID FILLED ORAL 2 TIMES DAILY PRN
Qty: 60 CAPSULE | Refills: 0 | Status: SHIPPED | OUTPATIENT
Start: 2018-01-18 | End: 2018-08-26

## 2018-01-18 RX ADMIN — FAMOTIDINE 20 MG: 20 TABLET, FILM COATED ORAL at 09:01

## 2018-01-18 RX ADMIN — OXYCODONE HYDROCHLORIDE AND ACETAMINOPHEN 0.5 TABLET: 7.5; 325 TABLET ORAL at 11:27

## 2018-01-18 RX ADMIN — DEXAMETHASONE 4 MG: 4 TABLET ORAL at 11:27

## 2018-01-18 RX ADMIN — DEXAMETHASONE 4 MG: 4 TABLET ORAL at 00:28

## 2018-01-18 RX ADMIN — DEXAMETHASONE 4 MG: 4 TABLET ORAL at 06:29

## 2018-01-18 RX ADMIN — CEFAZOLIN SODIUM 2 G: 2 INJECTION, SOLUTION INTRAVENOUS at 00:28

## 2018-01-18 NOTE — PLAN OF CARE
Problem: Patient Care Overview (Adult)  Goal: Plan of Care Review  Outcome: Ongoing (interventions implemented as appropriate)   01/18/18 0925   Coping/Psychosocial Response Interventions   Plan Of Care Reviewed With patient;spouse   Patient Care Overview   Progress progress toward functional goals as expected   Outcome Evaluation   Outcome Summary/Follow up Plan Patient demonstrated independence with all mobility. Increased gait distance to 650 feet and completed stair training with no difficulty. Reviewed HEP and patient demonstrated/verbalized understanding of HEP and precautions. Patient has been d/c home with spouse.        Problem: Inpatient Physical Therapy  Goal: Transfer Training Goal 1 LTG- PT  Outcome: Outcome(s) achieved Date Met: 01/18/18 01/18/18 0925   Transfer Training PT LTG   Transfer Training PT LTG, Date Established 01/18/18   Transfer Training PT LTG, Time to Achieve 3 days   Transfer Training PT LTG, Activity Type sit to stand/stand to sit   Transfer Training PT LTG, [place] Level independent   Transfer Training PT LTG, Assist Device other (see comments)  (no AD)   Transfer Training PT LTG, Date Goal Reviewed 01/18/18   Transfer Training PT LTG, Outcome goal met     Goal: Gait Training Goal LTG- PT  Outcome: Unable to achieve outcome(s) by discharge Date Met: 01/18/18 01/18/18 0925   Gait Training PT LTG   Gait Training Goal PT LTG, Date Established 01/17/18   Gait Training Goal PT LTG, Time to Achieve 3 days   Gait Training Goal PT LTG, [place] Level independent   Gait Training Goal PT LTG, Assist Device other (see comments)  (no AD)   Gait Training Goal PT LTG, Distance to Achieve 1,000 feet   Gait Training Goal PT LTG, Date Goal Reviewed 01/18/18   Gait Training Goal PT LTG, Outcome goal not met   Gait Training Goal PT LTG, Reason Goal Not Met discharged from facility     Goal: Stair Training Goal STG- PT  Outcome: Outcome(s) achieved Date Met: 01/18/18 01/18/18 0925   Stair  Training PT STG   Stair Training Goal PT STG, Date Established 01/17/18   Stair Training Goal PT STG, Time to Achieve 3 days   Stair Training Goal PT STG, Number of Steps 2   Stair Training Goal PT STG, [place] Level contact guard assist   Stair Training Goal PT STG, Assist Device other (see comments)  (no handrails)   Stair Training Goal PT STG, Date Goal Reviewed 01/18/18   Stair Training Goal PT STG, Outcome goal met     Goal: Stair Training Goal LTG- PT  Outcome: Outcome(s) achieved Date Met: 01/18/18 01/18/18 0925   Stair Training PT LTG   Stair Training Goal PT LTG, Date Established 01/17/18   Stair Training Goal PT LTG, Time to Achieve 3 days   Stair Training Goal PT LTG, Number of Steps 8   Stair Training Goal PT LTG, [place] Level contact guard assist   Stair Training Goal PT LTG, Assist Device 2 handrails   Stair Training Goal PT LTG, Date Goal Reviewed 01/18/18   Stair Training Goal PT LTG, Outcome goal met

## 2018-01-18 NOTE — THERAPY DISCHARGE NOTE
Acute Care - Physical Therapy Treatment Note/Discharge   [place]     Patient Name: Marcie Russ  : 1985  MRN: [id number]  Today's Date: 2018  Onset of Illness/Injury or Date of Surgery Date: 18  Date of Referral to PT: 18  Referring Physician: MD Dario    Admit Date: 2018    Visit Dx:    ICD-10-CM ICD-9-CM   1. Impaired functional mobility, balance, gait, and endurance Z74.09 V49.89     Patient Active Problem List   Diagnosis   • Neck pain   • S/P cervical spinal fusion   • Prediabetes       Physical Therapy Education     Title: PT OT SLP Therapies (Done)     Topic: Physical Therapy (Done)     Point: Mobility training (Done)    Learning Progress Summary    Learner Readiness Method Response Comment Documented by Status   Patient Acceptance E,MELYSSA,H SHAHID CLEANING Reviewed HEP, cervical precautions, correct stair training technique, correct car t/f technique, and correct technique for donning/doffing shoes and socks. LR 18 0942 Done    Acceptance E,MELYSSA CLEANING,NR Educated on cervical precautions, log roll technique, and HEP. LR 18 1620 Done   Significant Other Acceptance E,MELYSSA,H SHAHID CLEANING Reviewed HEP, cervical precautions, correct stair training technique, correct car t/f technique, and correct technique for donning/doffing shoes and socks. LR 18 0942 Done               Point: Home exercise program (Done)    Learning Progress Summary    Learner Readiness Method Response Comment Documented by Status   Patient Acceptance DAMARIS,MELYSSA,H SHAHID CLEANING Reviewed HEP, cervical precautions, correct stair training technique, correct car t/f technique, and correct technique for donning/doffing shoes and socks. LR 18 0942 Done    Acceptance E,D BLACK,NR Educated on cervical precautions, log roll technique, and HEP. LR 18 1620 Done   Significant Other Acceptance DAMARIS,MELYSSA,H SHAHID CLEANING Reviewed HEP, cervical precautions, correct stair training technique, correct car t/f technique, and correct technique for  donning/doffing shoes and socks. LR 01/18/18 0942 Done               Point: Body mechanics (Done)    Learning Progress Summary    Learner Readiness Method Response Comment Documented by Status   Patient Acceptance E,MELYSSA,H SHAHID CLEANING Reviewed HEP, cervical precautions, correct stair training technique, correct car t/f technique, and correct technique for donning/doffing shoes and socks. LR 01/18/18 0942 Done    Acceptance E,D BLACK,NR Educated on cervical precautions, log roll technique, and HEP. LR 01/17/18 1620 Done   Significant Other Acceptance E,D,H SHAHID CLEANING Reviewed HEP, cervical precautions, correct stair training technique, correct car t/f technique, and correct technique for donning/doffing shoes and socks. LR 01/18/18 0942 Done               Point: Precautions (Done)    Learning Progress Summary    Learner Readiness Method Response Comment Documented by Status   Patient Acceptance E,MELYSSA,H SHAHID CLEANING Reviewed HEP, cervical precautions, correct stair training technique, correct car t/f technique, and correct technique for donning/doffing shoes and socks. LR 01/18/18 0942 Done    Acceptance E,D BLACK,NR Educated on cervical precautions, log roll technique, and HEP. LR 01/17/18 1620 Done   Significant Other Acceptance E,MELYSSA,H SHAHID CLEANING Reviewed HEP, cervical precautions, correct stair training technique, correct car t/f technique, and correct technique for donning/doffing shoes and socks. LR 01/18/18 0942 Done                      User Key     Initials Effective Dates Name Provider Type Discipline    LR 06/19/15 -  Tana Dunn, PT Physical Therapist PT                    IP PT Goals       01/18/18 0925 01/17/18 1515       Bed Mobility PT LTG    Bed Mobility PT LTG, Date Established  01/17/18  -LR     Bed Mobility PT LTG, Time to Achieve  3 days  -LR     Bed Mobility PT LTG, Activity Type  supine to sit/sit to supine  -LR     Bed Mobility PT LTG, [place] Level  conditional independence  -LR     Bed Mobility PT LTG, Date Goal  Reviewed  01/17/18  -LR     Bed Mobility PT LTG, Outcome  goal met  -LR     Transfer Training PT LTG    Transfer Training PT LTG, Date Established 01/18/18  -LR 01/17/18  -LR     Transfer Training PT LTG, Time to Achieve 3 days  -LR 3 days  -LR     Transfer Training PT LTG, Activity Type sit to stand/stand to sit  -LR sit to stand/stand to sit  -LR     Transfer Training PT LTG, [place] Level independent  -LR independent  -LR     Transfer Training PT LTG, Assist Device other (see comments)   no AD  -LR other (see comments)   no AD  -LR     Transfer Training PT  LTG, Date Goal Reviewed 01/18/18  -LR 01/17/18  -LR     Transfer Training PT LTG, Outcome goal met  -LR goal ongoing  -LR     Gait Training PT LTG    Gait Training Goal PT LTG, Date Established 01/17/18  -LR 01/17/18  -LR     Gait Training Goal PT LTG, Time to Achieve 3 days  -LR 3 days  -LR     Gait Training Goal PT LTG, [place] Level independent  -LR independent  -LR     Gait Training Goal PT LTG, Assist Device other (see comments)   no AD  -LR other (see comments)   no AD  -LR     Gait Training Goal PT LTG, Distance to Achieve 1,000 feet  -LR 1,000 feet  -LR     Gait Training Goal PT LTG, Date Goal Reviewed 01/18/18  -LR 01/17/18  -LR     Gait Training Goal PT LTG, Outcome goal not met  -LR goal ongoing  -LR     Gait Training Goal PT LTG, Reason Goal Not Met discharged from facility  -LR      Stair Training PT STG    Stair Training Goal PT STG, Date Established 01/17/18  -LR 01/17/18  -LR     Stair Training Goal PT STG, Time to Achieve 3 days  -LR 3 days  -LR     Stair Training Goal PT STG, Number of Steps 2  -LR 2  -LR     Stair Training Goal PT STG, [place] Level contact guard assist  -LR contact guard assist  -LR     Stair Training Goal PT STG, Assist Device other (see comments)   no handrails  -LR other (see comments)   no handrails  -LR     Stair Training Goal PT STG, Date Goal Reviewed 01/18/18  -LR 01/17/18  -LR     Stair Training Goal  PT STG, Outcome goal met  -LR goal ongoing  -LR     Stair Training PT LTG    Stair Training Goal PT LTG, Date Established 01/17/18  -LR 01/17/18  -LR     Stair Training Goal PT LTG, Time to Achieve 3 days  -LR 3 days  -LR     Stair Training Goal PT LTG, Number of Steps 8  -LR 8  -LR     Stair Training Goal PT LTG, [place] Level contact guard assist  -LR contact guard assist  -LR     Stair Training Goal PT LTG, Assist Device 2 handrails  -LR 2 handrails  -LR     Stair Training Goal PT LTG, Date Goal Reviewed 01/18/18  -LR 01/17/18  -LR     Stair Training Goal PT LTG, Outcome goal met  -LR goal ongoing  -LR       User Key  (r) = Recorded By, (t) = Taken By, (c) = Cosigned By    Initials Name Provider Type    LR Tana Dunn, PT Physical Therapist              Adult Rehabilitation Note       01/18/18 0925          Rehab Assessment/Intervention    Discipline physical therapist  -LR      Document Type therapy note (daily note);discharge summary  -LR      Subjective Information agree to therapy;complains of;pain   reports numbness/tingling has improved in R hand  -LR      Patient Effort, Rehab Treatment excellent  -LR      Precautions/Limitations fall precautions;spinal precautions;other (see comments)   cervical precautions.   -LR      Recorded by [LR] Tana Dunn, PT      Pain Assessment    Pain Assessment 0-10  -LR      Pain Score 6  -LR      Post Pain Score 6  -LR      Pain Type Acute pain  -LR      Pain Location Neck  -LR      Pain Orientation Anterior  -LR      Pain Intervention(s) Repositioned;Ambulation/increased activity  -LR      Recorded by [LR] Tana Dunn, PT      Cognitive Assessment/Intervention    Current Cognitive/Communication Assessment functional  -LR      Orientation Status oriented x 4;required verbal cueing (specifiy in comments)  -LR      Follows Commands/Answers Questions 100% of the time;able to follow single-step instructions;needs cueing;needs repetition  -LR       Personal Safety WNL/WFL  -LR      Recorded by [LR] Tana Dunn, PT      Bed Mobility, Assessment/Treatment    Bed Mob, Supine to Sit, [place] not tested   UIC on arrival.   -LR      Bed Mob, Sit to Supine, [place] not tested   UIC at end of treatment.   -LR      Recorded by [LR] Tana Dunn, PT      Transfer Assessment/Treatment    Transfers, Sit-Stand [place] independent  -LR      Transfers, Stand-Sit [place] independent  -LR      Recorded by [LR] Tana Dunn, PT      Gait Assessment/Treatment    Gait, [place] Level supervision required  -LR      Gait, Assistive Device other (see comments)   no AD  -LR      Gait, Distance (Feet) 650  -LR      Gait, Gait Pattern Analysis swing-through gait  -LR      Gait, Impairments pain  -LR      Gait, Comment Patient ambulated with step through gait pattern at slow pace with good arm swing and no unsteadiness or LOB with ambulation.   -LR      Recorded by [LR] Tana Dunn, PT      Stairs Assessment/Treatment    Number of Stairs 10  -LR      Stairs, Handrail Location left side (ascending)   2 steps with no handrails  -LR      Stairs, [place] Level verbal cues required;supervision required  -LR      Stairs, Technique Used step over step (descending);step over step (ascending)  -LR      Stairs, Safety Issues sequencing ability decreased;weight-shifting ability decreased  -LR      Stairs, Impairments pain  -LR      Stairs, Comment Patient climbed 8 steps with use of only one handrail and 2 steps with no handrails for assist. Climbed stairs reciprocally and had no LOB or unsteadiness.   -LR      Recorded by [LR] Tana Dunn, PT      Therapy Exercises    Bilateral Upper Extremity AROM:;10 reps;sitting;shoulder abduction/adduction;shoulder rolls/shrugs;shoulder protraction/retraction   cues for technique  -LR      Recorded by [LR] Tana Dunn, PT      Sensory Assessment/Intervention    Sensory  Impairment tingling   now only in fingertips of fingers on R hand  -LR      LUE Light Touch WNL  -LR      RUE Light Touch mild impairment  -LR      Recorded by [LR] Tana Dunn, PT      Positioning and Restraints    Pre-Treatment Position sitting in chair/recliner  -LR      Post Treatment Position chair  -LR      In Chair notified nsg;reclined;sitting;call light within reach;encouraged to call for assist;with family/caregiver;legs elevated  -LR      Recorded by [LR] Tana Dunn, PT        User Key  (r) = Recorded By, (t) = Taken By, (c) = Cosigned By    Initials Name Effective Dates    LR Tana Dunn, PT 06/19/15 -           PT Recommendation and Plan  Anticipated Equipment Needs At Discharge:  (none)  Anticipated Discharge Disposition: home with assist  Planned Therapy Interventions: gait training, home exercise program, patient/family education, stair training, strengthening, transfer training, bed mobility training  PT Frequency: daily  Plan of Care Review  Plan Of Care Reviewed With: patient, spouse  Progress: progress toward functional goals as expected  Outcome Summary/Follow up Plan: Patient demonstrated independence with all mobility. Increased gait distance to 650 feet and completed stair training with no difficulty. Reviewed HEP and patient demonstrated/verbalized understanding of HEP and precautions. Patient has been d/c home with spouse.           Outcome Measures       01/18/18 0925 01/17/18 1515       How much help from another person do you currently need...    Turning from your back to your side while in flat bed without using bedrails? 4  -LR 4  -LR     Moving from lying on back to sitting on the side of a flat bed without bedrails? 4  -LR 4  -LR     Moving to and from a bed to a chair (including a wheelchair)? 4  -LR 3  -LR     Standing up from a chair using your arms (e.g., wheelchair, bedside chair)? 4  -LR 3  -LR     Climbing 3-5 steps with a railing? 3  -LR 3  -LR      To walk in hospital room? 3  -LR 3  -LR     AM-PAC 6 Clicks Score 22  -LR 20  -LR     Functional Assessment    Outcome Measure Options AM-PAC 6 Clicks Basic Mobility (PT)  -LR AM-PAC 6 Clicks Basic Mobility (PT)  -LR       User Key  (r) = Recorded By, (t) = Taken By, (c) = Cosigned By    Initials Name Provider Type    LR Tana Dunn, PT Physical Therapist           Time Calculation:         PT Charges       01/18/18 0943          Time Calculation    Start Time 0925  -LR      PT Received On 01/18/18  -LR      PT Goal Re-Cert Due Date 01/27/18  -LR      Time Calculation- PT    Total Timed Code Minutes- PT 8 minute(s)  -LR        User Key  (r) = Recorded By, (t) = Taken By, (c) = Cosigned By    Initials Name Provider Type    LR Tana Dunn, PT Physical Therapist          Therapy Charges for Today     Code Description Service Date Service Provider Modifiers Qty    87578101722 HC PT EVAL LOW COMPLEXITY 4 1/17/2018 Tana Dunn, PT GP 1    76390464156 HC GAIT TRAINING EA 15 MIN 1/18/2018 Tana Dunn, PT GP 1          PT G-Codes  Outcome Measure Options: AM-PAC 6 Clicks Basic Mobility (PT)    PT Discharge Summary  Anticipated Discharge Disposition: home with assist  Reason for Discharge: Discharge from facility  Outcomes Achieved: Able to achieve all goals within established timeline  Discharge Destination: Home with assist    Tana Dunn, PT  1/18/2018

## 2018-01-18 NOTE — DISCHARGE SUMMARY
Patient Name: Marcie Russ  MRN: [id number]  : 1985  DOS: 2018    Attending: No att. providers found    Primary Care Provider: IDALMIS Vazquez    Date of Admission:.2018  6:10 AM    Date of Discharge:  2018    Discharge Diagnosis: Principal Problem:    S/P cervical spinal fusion  Active Problems:    Neck pain    Prediabetes    Leukocytosis, likely reactive      Hospital Course  Patient is a 32 y.o. female presented for scheduled surgery listed below by Dr. Bishop under GA. She tolerated surgery well and was admitted for further medical management. Her neck and right shoulder have been painful for about 1 year. She had previous cervical fusion in .      Patient was provided pain medications as needed for pain control.    She was seen by PT and has progressed well over her stay.    She used an IS for atelectasis prophylaxis and mechanicals for DVT prophylaxis.  Home medications were resumed as appropriate, and labs were monitored and remained fairly stable.     With the progress she has made, she is ready for DC home today.    Discussed with patient regarding plan and she shows understanding and agreement.        Procedures Performed  PREOPERATIVE DIAGNOSES:   1.  Herniated disk with radiculopathy C4-C5.   2.  Status post C5-C6 fusion.      POSTOPERATIVE DIAGNOSES:   1.  Herniated disk with radiculopathy, C4-C5.   2.  Status post C5-C6 fusion.      PROCEDURES:   1.  Anterior cervical diskectomy and osteophytectomy C4-C5 to decompress neural elements.   2.  Anterior interbody fusion C4-C5 with DePuy interbody fusion cage and bone graft.  3.  Placement of anterior cervical plate C4-C5 (DePuy [place] plate).  4.  Removal of plate C5-C6 and inspection of fusion, fusion appears solid.   5.  Harvesting a bone graft locally from vertebral bodies and osteophytes.     SURGEON: Travis Bishop MD       Pertinent Test Results:    I reviewed the patient's new clinical results.  "    Results from last 7 days  Lab Units 18  0511 18  1305   WBC 10*3/mm3 12.68* 10.09   HEMOGLOBIN g/dL 13.8 14.2   HEMATOCRIT % 41.0 42.1   PLATELETS 10*3/mm3 240 207       Results from last 7 days  Lab Units 18  0511   SODIUM mmol/L 135   POTASSIUM mmol/L 3.7   CHLORIDE mmol/L 106   CO2 mmol/L 23.0   BUN mg/dL 8*   CREATININE mg/dL 0.60   CALCIUM mg/dL 9.2   GLUCOSE mg/dL 127*     Results for MICHELLE DENG (MRN [id number]) as of 2018 17:51   Ref. Range 2018 15:51 2018 22:43 2018 05:11 2018 06:45   Glucose Latest Ref Range: 70 - 130 mg/dL 127 173 (H) 127 (H) 146 (H)     I reviewed the patient's new imaging including images and reports.      Physical therapy: Patient demonstrated independence with all mobility. Increased gait distance to 650 feet and completed stair training with no difficulty. Reviewed HEP and patient demonstrated/verbalized understanding of HEP and precautions. Patient has been d/c home with spouse.     Discharge Assessment:    Vital Signs  /75  Pulse 99  Temp 97.8 °F (36.6 °C)  Resp 18  Ht 160 cm (63\")  Wt 102 kg (225 lb)  SpO2 98%  BMI 39.86 kg/m2  Temp (24hrs), Av.5 °F (36.9 °C), Min:97.8 °F (36.6 °C), Max:98.8 °F (37.1 °C)      General Appearance:    Alert, cooperative, in no acute distress   Lungs:     Clear to auscultation,respirations regular, even and                   unlabored    Heart:    Regular rhythm and normal rate, normal S1 and S2   Abdomen:     Normal bowel sounds, no masses, no organomegaly, soft        non-tender, non-distended, no guarding, no rebound                 tenderness   Extremities:   Moves all extremities well, no edema, no cyanosis, no              redness   Pulses:   Pulses palpable and equal bilaterally   Skin:   No bleeding, bruising or rash. Neck dressing CDI. LUC- out prior to discharge. Soft C-collar present   Neurologic:   Cranial nerves 2 - 12 grossly intact, sensation intact       Discharge " Disposition: Home    Discharge Medications   Marcie Russ   Home Medication Instructions GABY:[id number]    Printed on:01/18/18 8172   Medication Information                      acetaminophen (TYLENOL) 500 MG tablet  Take 500 mg by mouth Every 6 (Six) Hours As Needed for Mild Pain .             docusate sodium (COLACE) 100 MG capsule  Take 1 capsule by mouth 2 (Two) Times a Day As Needed for Constipation.             HYDROcodone-acetaminophen (NORCO) 7.5-325 MG per tablet  Take 1 tablet by mouth Every 4-6 Hours As Needed for Pain .             metFORMIN (GLUCOPHAGE) 500 MG tablet  Take 500 mg by mouth Daily With Breakfast.                 Discharge Diet: Consistent carb diet    Activity at Discharge: ambulate    Follow-up Appointments  Dr. Bishop per his orders    Seen and examined by me. Agree with above. Discussed with patient. IDALMIS Lake  01/18/18  5:50 PM

## 2018-01-18 NOTE — PLAN OF CARE
Problem: Patient Care Overview (Adult)  Goal: Plan of Care Review  Outcome: Ongoing (interventions implemented as appropriate)   01/18/18 0517   Coping/Psychosocial Response Interventions   Plan Of Care Reviewed With patient   Patient Care Overview   Progress progress toward functional goals as expected   Pain controlled. ion activity well.

## 2018-04-12 ENCOUNTER — TRANSCRIBE ORDERS (OUTPATIENT)
Dept: ADMINISTRATIVE | Facility: HOSPITAL | Age: 33
End: 2018-04-12

## 2018-04-12 DIAGNOSIS — R51.9 NONINTRACTABLE HEADACHE, UNSPECIFIED CHRONICITY PATTERN, UNSPECIFIED HEADACHE TYPE: Primary | ICD-10-CM

## 2018-04-13 ENCOUNTER — HOSPITAL ENCOUNTER (OUTPATIENT)
Dept: MRI IMAGING | Facility: HOSPITAL | Age: 33
Discharge: HOME | End: 2018-04-13

## 2018-04-16 ENCOUNTER — HOSPITAL ENCOUNTER (OUTPATIENT)
Dept: MRI IMAGING | Facility: HOSPITAL | Age: 33
Discharge: HOME | End: 2018-04-16
Admitting: NURSE PRACTITIONER

## 2018-04-16 DIAGNOSIS — R51.9 NONINTRACTABLE HEADACHE, UNSPECIFIED CHRONICITY PATTERN, UNSPECIFIED HEADACHE TYPE: ICD-10-CM

## 2018-04-16 PROCEDURE — 70551 MRI BRAIN STEM W/O DYE: CPT

## 2018-04-16 PROCEDURE — 70551 MRI BRAIN STEM W/O DYE: CPT | Performed by: RADIOLOGY

## 2018-08-26 ENCOUNTER — OFFICE VISIT (OUTPATIENT)
Dept: RETAIL CLINIC | Facility: CLINIC | Age: 33
End: 2018-08-26

## 2018-08-26 DIAGNOSIS — J06.9 ACUTE URI: Primary | ICD-10-CM

## 2018-08-26 PROCEDURE — 99213 OFFICE O/P EST LOW 20 MIN: CPT | Performed by: NURSE PRACTITIONER

## 2018-08-26 RX ORDER — GUAIFENESIN 600 MG/1
1200 TABLET, EXTENDED RELEASE ORAL 2 TIMES DAILY
Qty: 20 TABLET | Refills: 0
Start: 2018-08-26 | End: 2018-08-31

## 2018-08-26 RX ORDER — FLUTICASONE PROPIONATE 50 MCG
2 SPRAY, SUSPENSION (ML) NASAL DAILY
Qty: 1 BOTTLE | Refills: 0
Start: 2018-08-26 | End: 2018-09-25

## 2018-08-26 NOTE — PROGRESS NOTES
Subjective   Marcie Russ is a 32 y.o. female.   Chief Complaint   Patient presents with   • URI      URI    This is a new problem. The current episode started in the past 7 days. The problem has been waxing and waning. The maximum temperature recorded prior to her arrival was 101 - 101.9 F. The fever has been present for 1 to 2 days. Associated symptoms include congestion, coughing (green/brown), headaches and sinus pain. Pertinent negatives include no chest pain, rash, sore throat or wheezing. She has tried acetaminophen (Sudafed and had dose Tylenol at 7 am this morning) for the symptoms. The treatment provided mild relief.        Marcie Russ  presents to [facility] with cc of cough, nasal congestion sinus pressure for 5 days and low grade fever for 2 days. Reviewed the PMFSH. See ROS.    The following portions of the patient's history were reviewed and updated as appropriate: allergies, current medications, past family history, past medical history, past social history, past surgical history and problem list.    Review of Systems   Constitutional: Positive for fever. Negative for chills.   HENT: Positive for congestion, sinus pain and sinus pressure. Negative for sore throat.    Respiratory: Positive for cough (green/brown). Negative for wheezing.    Cardiovascular: Negative for chest pain.   Skin: Negative for rash.   Neurological: Positive for headaches.       There were no vitals taken for this visit.    Objective     Current Outpatient Prescriptions:   •  fluticasone (FLONASE) 50 MCG/ACT nasal spray, 2 sprays into the nostril(s) as directed by provider Daily for 30 days., Disp: 1 bottle, Rfl: 0  •  guaiFENesin (MUCINEX) 600 MG 12 hr tablet, Take 2 tablets by mouth 2 (Two) Times a Day for 5 days., Disp: 20 tablet, Rfl: 0  No Known Allergies    Physical Exam   Constitutional: She is oriented to person, place, and time. She appears well-developed and well-nourished. No distress.   HENT:   Head:  Normocephalic and atraumatic.   Right Ear: Tympanic membrane, external ear and ear canal normal.   Left Ear: Tympanic membrane, external ear and ear canal normal.   Nose: Mucosal edema present. Right sinus exhibits no maxillary sinus tenderness and no frontal sinus tenderness. Left sinus exhibits no maxillary sinus tenderness and no frontal sinus tenderness.   Mouth/Throat: Uvula is midline, oropharynx is clear and moist and mucous membranes are normal. No oropharyngeal exudate.   Eyes: Pupils are equal, round, and reactive to light. Conjunctivae and EOM are normal.   Neck: Normal range of motion. Neck supple.   Cardiovascular: Normal rate, regular rhythm and normal heart sounds.    Pulmonary/Chest: Effort normal and breath sounds normal. No respiratory distress. She has no wheezes.   Abdominal: Soft. Bowel sounds are normal.   Musculoskeletal: Normal range of motion.   Lymphadenopathy:     She has no cervical adenopathy.   Neurological: She is alert and oriented to person, place, and time.   Skin: Skin is warm and dry. No rash noted.   Psychiatric: She has a normal mood and affect. Her behavior is normal. Judgment and thought content normal.   Nursing note and vitals reviewed.      Lab Results (last 24 hours)     ** No results found for the last 24 hours. **          Assessment/Plan   Marcie was seen today for uri.    Diagnoses and all orders for this visit:    Acute URI  -     fluticasone (FLONASE) 50 MCG/ACT nasal spray; 2 sprays into the nostril(s) as directed by provider Daily for 30 days.  -     guaiFENesin (MUCINEX) 600 MG 12 hr tablet; Take 2 tablets by mouth 2 (Two) Times a Day for 5 days.

## 2018-08-26 NOTE — PATIENT INSTRUCTIONS
"Upper Respiratory Infection, Adult  Most upper respiratory infections (URIs) are a viral infection of the air passages leading to the lungs. A URI affects the nose, throat, and upper air passages. The most common type of URI is nasopharyngitis and is typically referred to as \"the common cold.\"  URIs run their course and usually go away on their own. Most of the time, a URI does not require medical attention, but sometimes a bacterial infection in the upper airways can follow a viral infection. This is called a secondary infection. Sinus and middle ear infections are common types of secondary upper respiratory infections.  Bacterial pneumonia can also complicate a URI. A URI can worsen asthma and chronic obstructive pulmonary disease (COPD). Sometimes, these complications can require emergency medical care and may be life threatening.  What are the causes?  Almost all URIs are caused by viruses. A virus is a type of germ and can spread from one person to another.  What increases the risk?  You may be at risk for a URI if:  · You smoke.  · You have chronic heart or lung disease.  · You have a weakened defense (immune) system.  · You are very young or very old.  · You have nasal allergies or asthma.  · You work in crowded or poorly ventilated areas.  · You work in health care facilities or schools.    What are the signs or symptoms?  Symptoms typically develop 2-3 days after you come in contact with a cold virus. Most viral URIs last 7-10 days. However, viral URIs from the influenza virus (flu virus) can last 14-18 days and are typically more severe. Symptoms may include:  · Runny or stuffy (congested) nose.  · Sneezing.  · Cough.  · Sore throat.  · Headache.  · Fatigue.  · Fever.  · Loss of appetite.  · Pain in your forehead, behind your eyes, and over your cheekbones (sinus pain).  · Muscle aches.    How is this diagnosed?  Your health care provider may diagnose a URI by:  · Physical exam.  · Tests to check that your " symptoms are not due to another condition such as:  ? Strep throat.  ? Sinusitis.  ? Pneumonia.  ? Asthma.    How is this treated?  A URI goes away on its own with time. It cannot be cured with medicines, but medicines may be prescribed or recommended to relieve symptoms. Medicines may help:  · Reduce your fever.  · Reduce your cough.  · Relieve nasal congestion.    Follow these instructions at home:  · Take medicines only as directed by your health care provider.  · Gargle warm saltwater or take cough drops to comfort your throat as directed by your health care provider.  · Use a warm mist humidifier or inhale steam from a shower to increase air moisture. This may make it easier to breathe.  · Drink enough fluid to keep your urine clear or pale yellow.  · Eat soups and other clear broths and maintain good nutrition.  · Rest as needed.  · Return to work when your temperature has returned to normal or as your health care provider advises. You may need to stay home longer to avoid infecting others. You can also use a face mask and careful hand washing to prevent spread of the virus.  · Increase the usage of your inhaler if you have asthma.  · Do not use any tobacco products, including cigarettes, chewing tobacco, or electronic cigarettes. If you need help quitting, ask your health care provider.  How is this prevented?  The best way to protect yourself from getting a cold is to practice good hygiene.  · Avoid oral or hand contact with people with cold symptoms.  · Wash your hands often if contact occurs.    There is no clear evidence that vitamin C, vitamin E, echinacea, or exercise reduces the chance of developing a cold. However, it is always recommended to get plenty of rest, exercise, and practice good nutrition.  Contact a health care provider if:  · You are getting worse rather than better.  · Your symptoms are not controlled by medicine.  · You have chills.  · You have worsening shortness of breath.  · You have  brown or red mucus.  · You have yellow or brown nasal discharge.  · You have pain in your face, especially when you bend forward.  · You have a fever.  · You have swollen neck glands.  · You have pain while swallowing.  · You have white areas in the back of your throat.  Get help right away if:  · You have severe or persistent:  ? Headache.  ? Ear pain.  ? Sinus pain.  ? Chest pain.  · You have chronic lung disease and any of the following:  ? Wheezing.  ? Prolonged cough.  ? Coughing up blood.  ? A change in your usual mucus.  · You have a stiff neck.  · You have changes in your:  ? Vision.  ? Hearing.  ? Thinking.  ? Mood.  This information is not intended to replace advice given to you by your health care provider. Make sure you discuss any questions you have with your health care provider.  Document Released: 06/13/2002 Document Revised: 08/20/2017 Document Reviewed: 03/25/2015  [employer] Interactive Patient Education © 2018 [employer]

## 2020-02-04 ENCOUNTER — HOSPITAL ENCOUNTER (OUTPATIENT)
Dept: MAMMOGRAPHY | Facility: HOSPITAL | Age: 35
Discharge: HOME | End: 2020-02-04

## 2020-02-04 ENCOUNTER — HOSPITAL ENCOUNTER (OUTPATIENT)
Dept: ULTRASOUND IMAGING | Facility: HOSPITAL | Age: 35
Discharge: HOME | End: 2020-02-04
Admitting: RADIOLOGY

## 2020-02-04 DIAGNOSIS — N63.0 LUMP OR MASS IN BREAST: ICD-10-CM

## 2020-02-04 PROCEDURE — 77066 DX MAMMO INCL CAD BI: CPT

## 2020-02-04 PROCEDURE — G0279 TOMOSYNTHESIS, MAMMO: HCPCS

## 2020-02-04 PROCEDURE — 76642 ULTRASOUND BREAST LIMITED: CPT

## 2020-02-04 PROCEDURE — 76642 ULTRASOUND BREAST LIMITED: CPT | Performed by: RADIOLOGY

## 2020-02-17 ENCOUNTER — OFFICE VISIT (OUTPATIENT)
Dept: SURGERY | Facility: CLINIC | Age: 35
End: 2020-02-17

## 2020-02-17 VITALS
HEIGHT: 64 IN | SYSTOLIC BLOOD PRESSURE: 147 MMHG | BODY MASS INDEX: 39.2 KG/M2 | WEIGHT: 229.6 LBS | HEART RATE: 97 BPM | DIASTOLIC BLOOD PRESSURE: 98 MMHG

## 2020-02-17 DIAGNOSIS — N63.0 BREAST MASS: Primary | ICD-10-CM

## 2020-02-17 PROCEDURE — 99203 OFFICE O/P NEW LOW 30 MIN: CPT | Performed by: SURGERY

## 2020-02-17 NOTE — PROGRESS NOTES
Subjective   Marcie Russ is a 34 y.o. female is being seen for consultation today at the request of Mahnaz Siegel MD for left breast mass.    History of Present Illness  Ms. Russ was seen in the office today for breast evaluation.  This is a 34-year-old female who presents with a palpable mass in the left breast which she noticed on breast self-examination in December.  The patient underwent a diagnostic mammogram and ultrasound on 2020 which demonstrated negative findings.  No mass was identified.  Patient now presents for further evaluation.  She denies nipple discharge.  There is no prior history of breast biopsy or cyst aspiration.  As far as risk factors go, Lala was 20 at the time that she had a first child with an onset of menses at age 11.  Family history is positive for breast cancer in a paternal grandmother.  The patient is premenopausal.    No Known Allergies  No current outpatient medications on file.     No current facility-administered medications for this visit.      Past Medical History:   Diagnosis Date   • Arthritis    • Eczema    • GERD (gastroesophageal reflux disease)    • Headache    • Neck pain    • PCOS (polycystic ovarian syndrome)    • Wears eyeglasses      Past Surgical History:   Procedure Laterality Date   • ANTERIOR CERVICAL DISCECTOMY W/ FUSION N/A 2018    Procedure: CERVICAL DISCECTOMY ANTERIOR WITH FUSION AND INSTRUMENTATION;  Surgeon: Travis Bishop MD;  Location: [facility];  Service:    • CERVICAL FUSION  2012    ACDF C5-6 (unknown dr/[facility])   •  SECTION     • DILATATION AND CURETTAGE     • LASER ABLATION CONDYLOMA CERVICAL / VULVAR     • TUBAL ABDOMINAL LIGATION       Review of Systems  General: negative  Integumentary: negative  Eyes: negative  ENT: negative  Respiratory: negative  Gastrointestinal: negative  Cardiovascular: negative  Neurological: negative  Psychiatric: negative  Hematologic/Lymphatic: negative  Genitourinary:  "negative  Musculoskeletal: painful joints, back pain and joint stiffness  Endocrine: negative  Breasts: breast lump        Objective   /98 (BP Location: Left arm)   Pulse 97   Ht 162.6 cm (64\")   Wt 104 kg (229 lb 9.6 oz)   BMI 39.41 kg/m²   Physical Exam  General:  This is a WD WN female in no acute distress  Vital signs stable, afebrile  HEENT exam:  WNL. Sclera are anicteric.  EOMI  Neck:  supple, FROM.  No JVD.  Trachea midline.  No palpable thyroid nodules. No supraclavicular adenopathy  Lungs:  Respiratory effort normal. Auscultation: Clear, without wheezes, rhonchi, rales  Heart:  Regular rate and rhythm, without murmur, gallop, rub.  No pedal edema  Breasts: On visual inspection the breasts are symmetrical.  Examination of the right breast demonstrates a moderate amount of nodularity.  No discrete masses appreciated.  There is no skin change or axillary adenopathy..  Nation of the left breast also demonstrates nodularity.  The patient's area of clinical concern is right at the inframammary fold in the seventh and o'clock position.  This area does have some discrete characteristics but ultrasound was utilized again to closely reevaluate the area and there is no suggestion of a mass of any kind.  There is no skin change or axillary adenopathy.  Abdomen: Nontender, without hepatosplenomegaly  Musculoskeletal:  muscle strength/tone is normal.    Psyc:  alert, oriented x 3.  Mood and affect are appropriate  skin:  Warm with good turgor.  Without rash or lesion  extremities:  Examination of the extremities revealed no cyanosis, clubbing or edema.    Results/Data  Mammogram and ultrasound reports and images were reviewed and agree with the assessment  Procedures       Assessment/Plan   Palpable abnormality left breast at the inframammary fold.  Clinically this has benign features and by imaging is benign.    Plan: Follow-up in 1 month for reevaluation           Discussion/Summary: The patient states that " she is concerned about potentially losing her insurance coverage and if she needs surgical intervention she would like to have it done while she is still on her current plan.    Patient's Body mass index is 39.41 kg/m². BMI is above normal parameters. Recommendations include: educational material.       Future Appointments   Date Time Provider Department Center   3/16/2020  1:30 PM Analia Simon MD [facility] None         Please note that portions of this note were completed with a voice recognition program.

## 2020-04-01 ENCOUNTER — TELEPHONE (OUTPATIENT)
Dept: SURGERY | Facility: CLINIC | Age: 35
End: 2020-04-01

## 2020-04-01 NOTE — TELEPHONE ENCOUNTER
Called Miss Reeves to let her know that she will need to come back in after COVID 19 to be placed on surgery schedule. Will place in recall.

## 2020-06-16 ENCOUNTER — TELEPHONE (OUTPATIENT)
Dept: SURGERY | Facility: CLINIC | Age: 35
End: 2020-06-16

## (undated) DEVICE — CANNULA,OXY,ADULT,SUPERSOFT,W/7'TUB,UC: Brand: MEDLINE

## (undated) DEVICE — 2963 MEDIPORE SOFT CLOTH TAPE 3 IN X 10 YD 12 RLS/CS: Brand: 3M™ MEDIPORE™

## (undated) DEVICE — PAD ARMBRD SURG CONVOL 7.5X20X2IN

## (undated) DEVICE — GOWN,REINF,POLY,ECL,PP SLV,3XL,XLONG: Brand: MEDLINE

## (undated) DEVICE — 3M™ DURAPORE™ SURGICAL TAPE 1538-3, 3 INCH X 10 YARD (7,5CM X 9,1M), 4 ROLLS/BOX: Brand: 3M™ DURAPORE™

## (undated) DEVICE — DIFFUSER: Brand: CORE, MAESTRO

## (undated) DEVICE — HEAD HALTER: Brand: DEROYAL

## (undated) DEVICE — TB SXN FRAZIER 8F STRL

## (undated) DEVICE — ADAPT ST INFUS ADMIN SYR 70IN

## (undated) DEVICE — Device

## (undated) DEVICE — GLV SURG SENSICARE W/ALOE PF LF 9 STRL

## (undated) DEVICE — ELECTRD BLD EDGE/INSUL1P 2.4X5.1MM STRL

## (undated) DEVICE — SNAP KOVER: Brand: UNBRANDED

## (undated) DEVICE — ANTIBACTERIAL UNDYED BRAIDED (POLYGLACTIN 910), SYNTHETIC ABSORBABLE SUTURE: Brand: COATED VICRYL

## (undated) DEVICE — INTENDED USE FOR SURGICAL MARKING ON INTACT SKIN, ALSO PROVIDES A PERMANENT METHOD OF IDENTIFYING OBJECTS IN THE OPERATING ROOM: Brand: WRITESITE® REGULAR TIP SKIN MARKER

## (undated) DEVICE — PK SPINE ORTHO 10

## (undated) DEVICE — DISPOSABLE BIPOLAR FORCEPS 7 3/4" (19.7CM) SCOVILLE BAYONET, INSULATED, 1.5MM TIP AND 12 FT. (3.6M) CABLE: Brand: KIRWAN

## (undated) DEVICE — AIRWY 90MM NO9

## (undated) DEVICE — SOL LR 1000ML

## (undated) DEVICE — SKYLINE ANTERIOR CERVICAL PLATE SYSTEM THREADED TEMPORARY FIX, PIN: Brand: SKYLINE

## (undated) DEVICE — 3M™ STERI-DRAPE™ INSTRUMENT POUCH 1018: Brand: STERI-DRAPE™

## (undated) DEVICE — SKYLINE ANTERIOR CERVICAL PLATE SYSTEM DRILL 2.2 X 12MM: Brand: SKYLINE

## (undated) DEVICE — 3.0MM PRECISION NEURO (MATCH HEAD)

## (undated) DEVICE — GAUZE,SPONGE,4"X4",16PLY,XRAY,STRL,LF: Brand: MEDLINE

## (undated) DEVICE — DRAIN JACKSON PRATT 10FR 7MM: Brand: CARDINAL HEALTH

## (undated) DEVICE — KITTNER SPONGE: Brand: DEROYAL

## (undated) DEVICE — SUT SILK 2/0 TIES 18IN A185H

## (undated) DEVICE — JACKSON-PRATT 100CC BULB RESERVOIR: Brand: CARDINAL HEALTH

## (undated) DEVICE — SEALANT HEMOS FLOSEAL MATRX W/MALL TP 5ML

## (undated) DEVICE — MEDI-VAC YANKAUER SUCTION HANDLE W/BULBOUS TIP: Brand: CARDINAL HEALTH

## (undated) DEVICE — LARYNG GLIDESCOPE COBALT/RANGER GVL3ST

## (undated) DEVICE — MAGNETIC DRAPE: Brand: DEVON

## (undated) DEVICE — LO CONTOUR COLLAR: Brand: DEROYAL

## (undated) DEVICE — SYR LL TP 10ML STRL

## (undated) DEVICE — MEDI-VAC NON-CONDUCTIVE SUCTION TUBING: Brand: CARDINAL HEALTH

## (undated) DEVICE — OIL CARTRIDGE: Brand: CORE, MAESTRO

## (undated) DEVICE — SUCTION CANISTER, 2500CC, RIGID: Brand: DEROYAL

## (undated) DEVICE — EXTENSION TAB: Brand: DEROYAL